# Patient Record
Sex: MALE | Race: BLACK OR AFRICAN AMERICAN | Employment: UNEMPLOYED | URBAN - METROPOLITAN AREA
[De-identification: names, ages, dates, MRNs, and addresses within clinical notes are randomized per-mention and may not be internally consistent; named-entity substitution may affect disease eponyms.]

---

## 2017-03-17 ENCOUNTER — OFFICE VISIT (OUTPATIENT)
Dept: BEHAVIORAL/MENTAL HEALTH CLINIC | Age: 57
End: 2017-03-17

## 2017-08-11 ENCOUNTER — HOSPITAL ENCOUNTER (INPATIENT)
Age: 57
LOS: 3 days | Discharge: HOME | End: 2017-08-15
Attending: INTERNAL MEDICINE | Admitting: PSYCHIATRY & NEUROLOGY
Payer: MEDICAID

## 2017-08-11 DIAGNOSIS — F19.10 POLYSUBSTANCE ABUSE (HCC): ICD-10-CM

## 2017-08-11 DIAGNOSIS — F10.10 ETOH ABUSE: ICD-10-CM

## 2017-08-11 DIAGNOSIS — F32.A DEPRESSIVE DISORDER: Primary | ICD-10-CM

## 2017-08-11 LAB
ALBUMIN SERPL BCP-MCNC: 3.9 G/DL (ref 3.5–5)
ALBUMIN/GLOB SERPL: 0.9 {RATIO} (ref 1.1–2.2)
ALP SERPL-CCNC: 53 U/L (ref 45–117)
ALT SERPL-CCNC: 81 U/L (ref 12–78)
AMPHET UR QL SCN: NEGATIVE
ANION GAP BLD CALC-SCNC: 10 MMOL/L (ref 5–15)
APPEARANCE UR: CLEAR
AST SERPL W P-5'-P-CCNC: 150 U/L (ref 15–37)
BACTERIA URNS QL MICRO: NEGATIVE /HPF
BARBITURATES UR QL SCN: NEGATIVE
BASOPHILS # BLD AUTO: 0.1 K/UL (ref 0–0.1)
BASOPHILS # BLD: 1 % (ref 0–1)
BENZODIAZ UR QL: NEGATIVE
BILIRUB SERPL-MCNC: 0.6 MG/DL (ref 0.2–1)
BILIRUB UR QL: NEGATIVE
BUN SERPL-MCNC: 9 MG/DL (ref 6–20)
BUN/CREAT SERPL: 7 (ref 12–20)
CALCIUM SERPL-MCNC: 8.8 MG/DL (ref 8.5–10.1)
CANNABINOIDS UR QL SCN: POSITIVE
CHLORIDE SERPL-SCNC: 97 MMOL/L (ref 97–108)
CO2 SERPL-SCNC: 27 MMOL/L (ref 21–32)
COCAINE UR QL SCN: POSITIVE
COLOR UR: NORMAL
CREAT SERPL-MCNC: 1.22 MG/DL (ref 0.7–1.3)
DRUG SCRN COMMENT,DRGCM: ABNORMAL
EOSINOPHIL # BLD: 0.2 K/UL (ref 0–0.4)
EOSINOPHIL NFR BLD: 3 % (ref 0–7)
EPITH CASTS URNS QL MICRO: NORMAL /LPF
ERYTHROCYTE [DISTWIDTH] IN BLOOD BY AUTOMATED COUNT: 13.3 % (ref 11.5–14.5)
ETHANOL SERPL-MCNC: 275 MG/DL
GLOBULIN SER CALC-MCNC: 4.5 G/DL (ref 2–4)
GLUCOSE SERPL-MCNC: 89 MG/DL (ref 65–100)
GLUCOSE UR STRIP.AUTO-MCNC: NEGATIVE MG/DL
HCT VFR BLD AUTO: 33.4 % (ref 36.6–50.3)
HGB BLD-MCNC: 11.6 G/DL (ref 12.1–17)
HGB UR QL STRIP: NEGATIVE
KETONES UR QL STRIP.AUTO: NEGATIVE MG/DL
LEUKOCYTE ESTERASE UR QL STRIP.AUTO: NEGATIVE
LYMPHOCYTES # BLD AUTO: 36 % (ref 12–49)
LYMPHOCYTES # BLD: 2.3 K/UL (ref 0.8–3.5)
MCH RBC QN AUTO: 32.4 PG (ref 26–34)
MCHC RBC AUTO-ENTMCNC: 34.7 G/DL (ref 30–36.5)
MCV RBC AUTO: 93.3 FL (ref 80–99)
METHADONE UR QL: NEGATIVE
MONOCYTES # BLD: 0.7 K/UL (ref 0–1)
MONOCYTES NFR BLD AUTO: 11 % (ref 5–13)
NEUTS SEG # BLD: 3.1 K/UL (ref 1.8–8)
NEUTS SEG NFR BLD AUTO: 49 % (ref 32–75)
NITRITE UR QL STRIP.AUTO: NEGATIVE
OPIATES UR QL: NEGATIVE
PCP UR QL: NEGATIVE
PH UR STRIP: 5 [PH] (ref 5–8)
PLATELET # BLD AUTO: 170 K/UL (ref 150–400)
POTASSIUM SERPL-SCNC: 4.2 MMOL/L (ref 3.5–5.1)
PROT SERPL-MCNC: 8.4 G/DL (ref 6.4–8.2)
PROT UR STRIP-MCNC: NEGATIVE MG/DL
RBC # BLD AUTO: 3.58 M/UL (ref 4.1–5.7)
RBC #/AREA URNS HPF: NORMAL /HPF (ref 0–5)
SODIUM SERPL-SCNC: 134 MMOL/L (ref 136–145)
SP GR UR REFRACTOMETRY: <1.005 (ref 1–1.03)
UA: UC IF INDICATED,UAUC: NORMAL
UROBILINOGEN UR QL STRIP.AUTO: 0.2 EU/DL (ref 0.2–1)
WBC # BLD AUTO: 6.3 K/UL (ref 4.1–11.1)
WBC URNS QL MICRO: NORMAL /HPF (ref 0–4)

## 2017-08-11 PROCEDURE — 80307 DRUG TEST PRSMV CHEM ANLYZR: CPT | Performed by: PHYSICIAN ASSISTANT

## 2017-08-11 PROCEDURE — 90791 PSYCH DIAGNOSTIC EVALUATION: CPT

## 2017-08-11 PROCEDURE — 99284 EMERGENCY DEPT VISIT MOD MDM: CPT

## 2017-08-11 PROCEDURE — 85025 COMPLETE CBC W/AUTO DIFF WBC: CPT | Performed by: PHYSICIAN ASSISTANT

## 2017-08-11 PROCEDURE — 81001 URINALYSIS AUTO W/SCOPE: CPT | Performed by: PHYSICIAN ASSISTANT

## 2017-08-11 PROCEDURE — 36415 COLL VENOUS BLD VENIPUNCTURE: CPT | Performed by: PHYSICIAN ASSISTANT

## 2017-08-11 PROCEDURE — 80053 COMPREHEN METABOLIC PANEL: CPT | Performed by: PHYSICIAN ASSISTANT

## 2017-08-11 PROCEDURE — 96365 THER/PROPH/DIAG IV INF INIT: CPT

## 2017-08-11 NOTE — IP AVS SNAPSHOT
[facility] 
 
 
 [address] Patient: Moraima Braun MRN: [id number] UYI:2/67/0157 You are allergic to the following No active allergies Recent Documentation Height Weight BMI Smoking Status 1.88 m 77.1 kg 21.83 kg/m2 Current Some Day Smoker Emergency Contacts Name Discharge Info Relation Home Work Mobile Hernandez,Christine N/A  AT THIS TIME [6] Other Relative [6] [phone] About your hospitalization You were admitted on:  August 12, 2017 You last received care in the:  [facility]. 291 You were discharged on:  August 15, 2017 Unit phone number:  [phone] Why you were hospitalized Your primary diagnosis was:  Alcohol Dependence With Alcohol-Induced Mood Disorder (Hcc) Your diagnoses also included:  Polysubstance Abuse, Htn (Hypertension), Malingering, Alcohol Withdrawal Syndrome Without Complication (Hcc) Providers Seen During Your Hospitalizations Provider Role Specialty Primary office phone Ochoa Salgado MD Attending Provider Emergency Medicine [phone] Jordan Mojica MD Attending Provider Psychiatry [phone] Your Primary Care Physician (PCP) Primary Care Physician Office Phone Office Fax Brandtarie Yanes [phone] Follow-up Information Follow up With Details Comments Contact Info [address] Nik Cho  Walk-in appointment 8/16/17 @ 7:30am for mental health assessment and services and substance abuse services  [address] [place] [id number] 
[phone] Current Discharge Medication List  
  
START taking these medications Dose & Instructions Dispensing Information Comments Morning Noon Evening Bedtime  
 gabapentin 300 mg capsule Commonly known as:  NEURONTIN Your last dose was: Your next dose is: Dose:  300 mg Take 1 Cap by mouth three (3) times daily for 3 days. Indications: alcohol withdrawal sxs Quantity:  9 Cap Refills:  0 CONTINUE these medications which have NOT CHANGED Dose & Instructions Dispensing Information Comments Morning Noon Evening Bedtime  
 lisinopril 5 mg tablet Commonly known as:  Alissa Aisha Your last dose was: Your next dose is:    
   
   
 Dose:  5 mg Take 1 Tab by mouth daily. Indications: hypertension Quantity:  14 Tab Refills:  1 STOP taking these medications ALPRAZolam 0.25 mg tablet Commonly known as:  XANAX  
   
  
 diazePAM 5 mg tablet Commonly known as:  VALIUM  
   
  
 ibuprofen 800 mg tablet Commonly known as:  MOTRIN  
   
  
 methocarbamol 500 mg tablet Commonly known as:  ROBAXIN  
   
  
 thiamine 100 mg tablet Commonly known as:  B-1  
   
  
 traMADol 50 mg tablet Commonly known as:  ULTRAM  
   
  
  
  
Where to Get Your Medications Information on where to get these meds will be given to you by the nurse or doctor. ! Ask your nurse or doctor about these medications  
  gabapentin 300 mg capsule  
 lisinopril 5 mg tablet Discharge Instructions DISCHARGE SUMMARY from Nurse The following personal items are in your possession at time of discharge: 
 
Dental Appliances: None Visual Aid: None Home Medications: None Jewelry: None Clothing: Belt, Footwear, [place], Jacket/Coat, [place], Shirt Other Valuables:  (sent ot safe) PATIENT INSTRUCTIONS: 
 
 
 
 
What to do at Home: 
Recommended activity: Activity as tolerated, If I feel that I can not keep these promises and I am at risk of hurting myself or others, I will call the crisis office and speak with a crisis worker who will assist me during my crisis. [address] [place] [address]  [phone] [place] [address]   [phone] [facility]  [phone] [facility]  [phone] *  Please give a list of your current medications to your Primary Care Provider. *  Please update this list whenever your medications are discontinued, doses are 
    changed, or new medications (including over-the-counter products) are added. *  Please carry medication information at all times in case of emergency situations. These are general instructions for a healthy lifestyle: No smoking/ No tobacco products/ Avoid exposure to second hand smoke Surgeon General's Warning:  Quitting smoking now greatly reduces serious risk to your health. Obesity, smoking, and sedentary lifestyle greatly increases your risk for illness A healthy diet, regular physical exercise & weight monitoring are important for maintaining a healthy lifestyle Recognize signs and symptoms of STROKE: 
 
F-face looks uneven A-arms unable to move or move unevenly S-speech slurred or non-existent T-time-call 911 as soon as signs and symptoms begin-DO NOT go Back to bed or wait to see if you get better-TIME IS BRAIN. Warning Signs of HEART ATTACK Call 911 if you have these symptoms: 
? Chest discomfort. Most heart attacks involve discomfort in the center of the chest that lasts more than a few minutes, or that goes away and comes back. It can feel like uncomfortable pressure, squeezing, fullness, or pain. ? Discomfort in other areas of the upper body. Symptoms can include pain or discomfort in one or both arms, the back, neck, jaw, or stomach. ? Shortness of breath with or without chest discomfort. ? Other signs may include breaking out in a cold sweat, nausea, or lightheadedness. Don't wait more than five minutes to call [address]! Fast action can save your life.  Calling 911 is almost always the fastest way to get lifesaving treatment. Emergency Medical Services staff can begin treatment when they arrive  up to an hour sooner than if someone gets to the hospital by car. The discharge information has been reviewed with the patient. The patient verbalized understanding. Discharge medications reviewed with the patient and appropriate educational materials and side effects teaching were provided. Discharge Orders None SmartMovehart Announcement We are excited to announce that we are making your provider's discharge notes available to you in Wireless Generation. You will see these notes when they are completed and signed by the physician that discharged you from your recent hospital stay. If you have any questions or concerns about any information you see in SmartMovehart, please call the Health Information Department where you were seen or reach out to your Primary Care Provider for more information about your plan of care. Introducing [facility]! [facility] introduces Wireless Generation patient portal. Now you can access parts of your medical record, email your doctor's office, and request medication refills online. 1. In your internet browser, go to [email or web address] 2. Click on the First Time User? Click Here link in the Sign In box. You will see the New Member Sign Up page. 3. Enter your Wireless Generation Access Code exactly as it appears below. You will not need to use this code after youve completed the sign-up process. If you do not sign up before the expiration date, you must request a new code. · Wireless Generation Access Code: [email or web address] Expires: 11/13/2017 10:36 AM 
 
4. Enter the last four digits of your Social Security Number (xxxx) and Date of Birth (mm/dd/yyyy) as indicated and click Submit. You will be taken to the next sign-up page. 5. Create a Wireless Generation ID. This will be your Wireless Generation login ID and cannot be changed, so think of one that is secure and easy to remember. 6. Create a PublicBeta password. You can change your password at any time. 7. Enter your Password Reset Question and Answer. This can be used at a later time if you forget your password. 8. Enter your e-mail address. You will receive e-mail notification when new information is available in [address] 9. Click Sign Up. You can now view and download portions of your medical record. 10. Click the Download Summary menu link to download a portable copy of your medical information. If you have questions, please visit the Frequently Asked Questions section of the PublicBeta website. Remember, PublicBeta is NOT to be used for urgent needs. For medical emergencies, dial 911. Now available from your iPhone and Android! General Information Please provide this summary of care documentation to your next provider. Patient Signature:  ____________________________________________________________ Date:  ____________________________________________________________  
  
Maddie Yoo Provider Signature:  ____________________________________________________________ Date:  ____________________________________________________________

## 2017-08-11 NOTE — IP AVS SNAPSHOT
[facility] 
 
 
 [address] Patient: Sarah Banks MRN: [id number] VLE:9/[id number] Current Discharge Medication List  
  
START taking these medications Dose & Instructions Dispensing Information Comments Morning Noon Evening Bedtime  
 gabapentin 300 mg capsule Commonly known as:  NEURONTIN Your last dose was: Your next dose is:    
   
   
 Dose:  300 mg Take 1 Cap by mouth three (3) times daily for 3 days. Indications: alcohol withdrawal sxs Quantity:  9 Cap Refills:  0 CONTINUE these medications which have NOT CHANGED Dose & Instructions Dispensing Information Comments Morning Noon Evening Bedtime  
 lisinopril 5 mg tablet Commonly known as:  Deirdre Flight Your last dose was: Your next dose is:    
   
   
 Dose:  5 mg Take 1 Tab by mouth daily. Indications: hypertension Quantity:  14 Tab Refills:  1 STOP taking these medications ALPRAZolam 0.25 mg tablet Commonly known as:  XANAX  
   
  
 diazePAM 5 mg tablet Commonly known as:  VALIUM  
   
  
 ibuprofen 800 mg tablet Commonly known as:  MOTRIN  
   
  
 methocarbamol 500 mg tablet Commonly known as:  ROBAXIN  
   
  
 thiamine 100 mg tablet Commonly known as:  B-1  
   
  
 traMADol 50 mg tablet Commonly known as:  ULTRAM  
   
  
  
  
Where to Get Your Medications Information on where to get these meds will be given to you by the nurse or doctor. ! Ask your nurse or doctor about these medications  
  gabapentin 300 mg capsule  
 lisinopril 5 mg tablet

## 2017-08-12 PROBLEM — F32.A DEPRESSIVE DISORDER: Status: ACTIVE | Noted: 2017-08-12

## 2017-08-12 PROBLEM — F19.10 POLYSUBSTANCE ABUSE (HCC): Status: ACTIVE | Noted: 2017-08-12

## 2017-08-12 LAB — ETHANOL SERPL-MCNC: 114 MG/DL

## 2017-08-12 PROCEDURE — 65220000003 HC RM SEMIPRIVATE PSYCH

## 2017-08-12 PROCEDURE — 80307 DRUG TEST PRSMV CHEM ANLYZR: CPT | Performed by: INTERNAL MEDICINE

## 2017-08-12 PROCEDURE — 36415 COLL VENOUS BLD VENIPUNCTURE: CPT | Performed by: INTERNAL MEDICINE

## 2017-08-12 PROCEDURE — 74011250636 HC RX REV CODE- 250/636: Performed by: INTERNAL MEDICINE

## 2017-08-12 PROCEDURE — 74011000250 HC RX REV CODE- 250: Performed by: INTERNAL MEDICINE

## 2017-08-12 PROCEDURE — 74011250637 HC RX REV CODE- 250/637: Performed by: PSYCHIATRY & NEUROLOGY

## 2017-08-12 RX ORDER — FOLIC ACID 5 MG/ML
INJECTION, SOLUTION INTRAMUSCULAR; INTRAVENOUS; SUBCUTANEOUS
Status: DISPENSED
Start: 2017-08-12 | End: 2017-08-12

## 2017-08-12 RX ORDER — ZOLPIDEM TARTRATE 10 MG/1
10 TABLET ORAL
Status: DISCONTINUED | OUTPATIENT
Start: 2017-08-12 | End: 2017-08-15 | Stop reason: HOSPADM

## 2017-08-12 RX ORDER — BENZTROPINE MESYLATE 2 MG/1
2 TABLET ORAL
Status: DISCONTINUED | OUTPATIENT
Start: 2017-08-12 | End: 2017-08-15 | Stop reason: HOSPADM

## 2017-08-12 RX ORDER — LORAZEPAM 1 MG/1
1 TABLET ORAL
Status: DISCONTINUED | OUTPATIENT
Start: 2017-08-12 | End: 2017-08-12

## 2017-08-12 RX ORDER — LISINOPRIL 5 MG/1
5 TABLET ORAL DAILY
Status: DISCONTINUED | OUTPATIENT
Start: 2017-08-13 | End: 2017-08-15 | Stop reason: HOSPADM

## 2017-08-12 RX ORDER — IBUPROFEN 200 MG
1 TABLET ORAL
Status: DISCONTINUED | OUTPATIENT
Start: 2017-08-12 | End: 2017-08-15 | Stop reason: HOSPADM

## 2017-08-12 RX ORDER — IBUPROFEN 400 MG/1
400 TABLET ORAL
Status: DISCONTINUED | OUTPATIENT
Start: 2017-08-12 | End: 2017-08-15 | Stop reason: HOSPADM

## 2017-08-12 RX ORDER — LANOLIN ALCOHOL/MO/W.PET/CERES
100 CREAM (GRAM) TOPICAL DAILY
Status: DISCONTINUED | OUTPATIENT
Start: 2017-08-12 | End: 2017-08-15

## 2017-08-12 RX ORDER — ADHESIVE BANDAGE
30 BANDAGE TOPICAL DAILY PRN
Status: DISCONTINUED | OUTPATIENT
Start: 2017-08-12 | End: 2017-08-15 | Stop reason: HOSPADM

## 2017-08-12 RX ORDER — ACETAMINOPHEN 325 MG/1
650 TABLET ORAL
Status: DISCONTINUED | OUTPATIENT
Start: 2017-08-12 | End: 2017-08-15 | Stop reason: HOSPADM

## 2017-08-12 RX ORDER — LORAZEPAM 2 MG/ML
2 INJECTION INTRAMUSCULAR
Status: DISCONTINUED | OUTPATIENT
Start: 2017-08-12 | End: 2017-08-12

## 2017-08-12 RX ORDER — LORAZEPAM 1 MG/1
4 TABLET ORAL
Status: DISCONTINUED | OUTPATIENT
Start: 2017-08-12 | End: 2017-08-15 | Stop reason: HOSPADM

## 2017-08-12 RX ORDER — LORAZEPAM 1 MG/1
2 TABLET ORAL
Status: DISCONTINUED | OUTPATIENT
Start: 2017-08-12 | End: 2017-08-15 | Stop reason: HOSPADM

## 2017-08-12 RX ORDER — PROCHLORPERAZINE EDISYLATE 5 MG/ML
10 INJECTION INTRAMUSCULAR; INTRAVENOUS
Status: DISCONTINUED | OUTPATIENT
Start: 2017-08-12 | End: 2017-08-15 | Stop reason: HOSPADM

## 2017-08-12 RX ORDER — FOLIC ACID 1 MG/1
1 TABLET ORAL DAILY
Status: DISCONTINUED | OUTPATIENT
Start: 2017-08-12 | End: 2017-08-15

## 2017-08-12 RX ORDER — THIAMINE HYDROCHLORIDE 100 MG/ML
INJECTION, SOLUTION INTRAMUSCULAR; INTRAVENOUS
Status: DISPENSED
Start: 2017-08-12 | End: 2017-08-12

## 2017-08-12 RX ORDER — OLANZAPINE 5 MG/1
5 TABLET ORAL
Status: DISCONTINUED | OUTPATIENT
Start: 2017-08-12 | End: 2017-08-15 | Stop reason: HOSPADM

## 2017-08-12 RX ORDER — PROCHLORPERAZINE MALEATE 10 MG
10 TABLET ORAL
Status: DISCONTINUED | OUTPATIENT
Start: 2017-08-12 | End: 2017-08-15 | Stop reason: HOSPADM

## 2017-08-12 RX ORDER — BENZTROPINE MESYLATE 1 MG/ML
2 INJECTION INTRAMUSCULAR; INTRAVENOUS
Status: DISCONTINUED | OUTPATIENT
Start: 2017-08-12 | End: 2017-08-15 | Stop reason: HOSPADM

## 2017-08-12 RX ORDER — LORAZEPAM 1 MG/1
2 TABLET ORAL
Status: COMPLETED | OUTPATIENT
Start: 2017-08-12 | End: 2017-08-12

## 2017-08-12 RX ADMIN — FOLIC ACID 1 MG: 1 TABLET ORAL at 09:26

## 2017-08-12 RX ADMIN — MULTIPLE VITAMINS W/ MINERALS TAB 1 TABLET: TAB at 09:26

## 2017-08-12 RX ADMIN — THIAMINE HYDROCHLORIDE: 100 INJECTION, SOLUTION INTRAMUSCULAR; INTRAVENOUS at 01:53

## 2017-08-12 RX ADMIN — Medication 100 MG: at 09:26

## 2017-08-12 RX ADMIN — LORAZEPAM 2 MG: 1 TABLET ORAL at 09:26

## 2017-08-12 RX ADMIN — LORAZEPAM 2 MG: 1 TABLET ORAL at 11:25

## 2017-08-12 RX ADMIN — LORAZEPAM 2 MG: 1 TABLET ORAL at 09:57

## 2017-08-12 NOTE — BSMART NOTE
Comprehensive Assessment Form Part 1      Section I - Disposition    Axis I - F32.9 Unspecified depressive disorder                 Cocaine Use disorder                   Alcohol dependency   Axis II - No diagnosis   Axis III -    Hypertension     Axis IV - Problems with personal support system, lack of access to treatment, homelessness, substance use  Udell V - 65      The Medical Doctor to Psychiatrist conference was not completed. The Medical Doctor is in agreement with Psychiatrist disposition because of (reason) voluntary hospitalization stay is least restrictive intervention at this time. The plan is  Admit to [facility] unit 323-1. The on-call Psychiatrist consulted was Dr. Timothy Zheng. The admitting Psychiatrist will be Dr. Timothy Zheng. The admitting Diagnosis is Unspecified Depressive Disorder   The Payor source is medicaid/medicare. The name of the representative was N/A. No approval needed. Section II - Integrated Summary  Summary:Patient presented to the ED today reporting that he Is going through a \"mental depression breakdown\" and asked for immediate intervention to avoid exacerbation of his current symptoms. Primary complaint today is that Patient is homeless and has felt that he has hit rock bottom. Patient indicated that he has been having suicidal thoughts for the past few days due to him not having adequate housing, money, or a support system. He indicated that he is afraid of what I might do and indicated that he did not feel that he could keep himself safe outside of the hospital. Patient does not have a history of psychiatric evaluations and does not have strong history of mental health treatment. He reports last seeing a psychiatrist in 2016 and has not been managed with medication by a psychiatrist since then. Patient self-reported that he was being treated for depression and anxiety with Dr. Tika Gannon prior to the practice closing.  Patient reports that he has been diagnosed with a depressive disorder for about 8-9 years and has always managed his treatment outpatient. He denied ever being service connected for case management; has limited knowledge of his medical coverage or services available to him. Patient reports that he is current prescribed Xanax, lamictal, and a blood pressure medication; being prescribed by PCP. He states that he has not taken his medication for 1.5 weeks. He reports that he has had passive thoughts of suicide in the past but identified that SI were more intense tonight. He states that he is unable to contract for safety at this time and does want support. Patient indicated that that he self-medicates with crack and marijuana typically and has used drugs and alcohol in the past 48 hours. Today, he has a MITA of 257 upon intake and is positive for both crack and marijuana. Patient is requesting inpatient hospitalization to assist him with stabilizing on medication, learning coping skills, and service coordination prior to discharge. Dr. Codi Simon was consulted after [facility] assessment and determined that Patient was too intoxicated to consent to treatment; recommendation was that Patient sober up and be reassessed within 4 hours. Patient will be reassessed at 4:30 am once MITA is repeated.     5:02am  Repeat MITA was completed and resulted. Patient is at 114 MITA. Though it is still above the requested 100 Patient is oriented and able to consent for treatment. He is oriented in all spheres, endorsing SI and denying psychosis. Reassessment was completed and Patient maintains that he is unable to contact for safety at this time. He is requesting inpatient treatment to assist him with coping skills, managing depression with medication, and service coordination at discharge. Patient is pre-contemplative with SA but accepted supportive counseling around sobriety; we discussed mixing illegal drugs with psychotropic medication. Patient was accepted at [facility] unit room 323 bed 1.  Patient has Medicaid/Medicare insurance as payor source. The patienthas demonstrated mental capacity to provide informed consent. The information is given by the patient. The Chief Complaint is depression   The Precipitant Factors are homelessness; lack of finances, lack of support system  Previous Hospitalizations: Not Reported or noted   The patient has not previously been in restraints. Current Psychiatrist and/or  is Patient does not have a current psychiatrist.     Lethality Assessment:    The potential for suicide noted by the following: ideation . The potential for homicide is not noted. The patient has not been a perpetrator of sexual or physical abuse. There are not pending charges. The patient is not felt to be at risk for self harm or harm to others. The attending nurse was advised that Patient is not acute at this time. Section III - Psychosocial  The patient's overall mood and attitude is depressed. Feelings of helplessness and hopelessness are observed by verbalizations. Generalized anxiety is not observed although it was communicated by Patient. Panic is not observed. Phobias are not observed. Obsessive compulsive tendencies are not observed. Section IV - Mental Status Exam  The patient's appearance shows poor hygiene. The patient's behavior show psychomotor dysfunction. The patient is oriented to time, place, person and situation. The patient's speech is slowed. The patient's mood is depressed. The range of affect is flat. The patient's thought content demonstrates no evidence of impairment. The thought process shows no evidence of impairment. The patient's perception shows no evidence of impairment. The patient's memory shows no evidence of impairment. The patient's appetite is decreased but it is not determined whether the Patient shows signs of weight loss. The patient's sleep has evidence of insomnia. The patient's insight is blaming.   The patient's judgement is psychologically impaired. Section V - Substance Abuse  The patient is using substances. The patient is using tobacco by inhalation, Crack by inhalation, marijuana by inhalation for greater than 2 years with last use on or around 8/10. The patient has experienced the following withdrawal symptoms: N/A. Section VI - Living Arrangements  The patient is single. The patient was living with a female friend but reports that he was told to leave about 1 week ago. The patient has one child age [id number] [place]. The patient does not plan to return home upon discharge. The patient does have legal issues pending. The patient's source of income comes from social security. Orthodoxy and cultural practices have been noted and include: Baptism. The patient's greatest support comes from none reported and this person will not be involved with the treatment. The patient has not been in an event described as horrible or outside the realm of ordinary life experience either currently or in the past.  The patient has not been a victim of sexual/physical abuse. Section VII - Other Areas of Clinical Concern  The highest grade achieved is 12th grade with the overall quality of school experience being described as not reported. The patient is currently disabled and speaks [place] as a primary language. The patient has no communication impairments affecting communication. The patient's preference for learning can be described as: can read and write adequately.   The patient's hearing is normal.  The patient's vision is normal.      Sharon Hernandez MA, Resident in Counseling   [facility] Counselor

## 2017-08-12 NOTE — BH NOTES
Patient is a 75years old male admitted to the unit voluntarily with depression, with suicidal ideation with no plan. Patient reports he is depressed for a week. BAL - 275, repeated at 0430 hrs - 114. UDS positive for cocaine and THC. Medical conditions includes Hypertension, No known allergies. On admission patient is cooperative with admission process. Skin assessment done by William RICHARDSON and Emelyn SCHUMACHER which is unremarkable except dry skin of the feet. No tattoos or open cuts or wounds noted. Ambulating with steady gait. Q 15 minutes monitoring maintained for the safety.

## 2017-08-12 NOTE — BH NOTES
Problem: Depressed Mood (Adult/Pediatric)  Goal: *STG: Remains safe in hospital  Outcome: Progressing Towards Goal  Pt is mostly quiet in the milieu. Pt's affect is brighter, mood less anxious, more relaxed. Pt is meds/meals compliant. Pt has good appetite. Pt is going through a safe alcohol detox presently.  Will continue to monitor q 15 for safety, mood and behavior changes.
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
## 2017-08-12 NOTE — ED NOTES
Report called to [facility]. SBAR given Pt to be transported to [facility] via wheelchair by [email or web address].

## 2017-08-12 NOTE — BH NOTES
Problem: Depressed Mood (Adult/Pediatric)  Goal: *STG: Remains safe in hospital  Outcome: Progressing Towards Goal  Pt is mostly quiet in the milieu. Pt's affect is brighter, mood less anxious. Pt is meds/meals compliant. Pt is going through a safe alcohol detox presently. Will continue to monitor q 15 for safety, mood and behavior changes.

## 2017-08-12 NOTE — ED PROVIDER NOTES
Patient is a 61 y.o. male presenting with mental health disorder. The history is provided by the patient. Mental Health Problem    This is a new problem. Episode onset: pt reports depression and SI without a plan x 1wk. pt is homeless. The problem has not changed since onset. Pertinent negatives include no weakness. Mental status baseline is normal.  Risk factors include the patient not taking meds correctly and alcohol intake. His past medical history is significant for hypertension. His past medical history does not include psychotropic medication treatment. Past medical history comments: anxiety. Past Medical History:   Diagnosis Date    Hypertension     Psychiatric disorder     anxiety       History reviewed. No pertinent surgical history. History reviewed. No pertinent family history. Social History     Social History    Marital status: UNKNOWN     Spouse name: N/A    Number of children: N/A    Years of education: N/A     Occupational History    Not on file. Social History Main Topics    Smoking status: Current Some Day Smoker     Packs/day: 0.25    Smokeless tobacco: Never Used    Alcohol use 6.0 oz/week     10 Cans of beer per week      Comment: daily 6/27/16    Drug use: Yes     Special: Marijuana    Sexual activity: Not on file     Other Topics Concern    Not on file     Social History Narrative         ALLERGIES: Review of patient's allergies indicates no known allergies. Review of Systems   Respiratory: Negative for shortness of breath and wheezing. Gastrointestinal: Negative. Neurological: Positive for headaches. Negative for speech difficulty and weakness. Psychiatric/Behavioral: Positive for suicidal ideas. All other systems reviewed and are negative.       Vitals:    08/11/17 2132   BP: (!) 146/114   Pulse: (!) 104   Resp: 18   Temp: 98 °F (36.7 °C)   SpO2: 99%   Weight: 77.1 kg (170 lb)   Height: 6' 2\" (1.88 m)            Physical Exam   Constitutional: He is oriented to person, place, and time. He appears well-developed and well-nourished. No distress. HENT:   Head: Normocephalic and atraumatic. Mouth/Throat: Oropharynx is clear and moist. No oropharyngeal exudate. Eyes: Conjunctivae are normal.   Cardiovascular: Normal rate, regular rhythm and normal heart sounds. Pulmonary/Chest: Effort normal and breath sounds normal. No respiratory distress. He has no wheezes. He has no rales. Abdominal: Soft. Bowel sounds are normal.   Musculoskeletal: Normal range of motion. Neurological: He is alert and oriented to person, place, and time. Skin: Skin is warm and dry. Psychiatric: He has a normal mood and affect. His behavior is normal. Judgment and thought content normal.   Nursing note and vitals reviewed.        MDM  Number of Diagnoses or Management Options  Diagnosis management comments: DDX: depression, SI, malingering, ETOH intoxication    Progress Note:  12:00 AM  [facility] just arrived for evaluation, care turned over to attending, Dr Hanh Garrett, at this time for dispo and tx plan       Amount and/or Complexity of Data Reviewed  Clinical lab tests: ordered and reviewed  Discuss the patient with other providers: yes      ED Course       Procedures

## 2017-08-12 NOTE — H&P
History and Physical    Subjective:     Sabine Vanegas is a 61 y.o. male with past medical hx significant for HTN. Pt is admitted to the hospital for Depression. Pt is abusing Etoh, Cocaine, and Marjuanna. Pt is on Travisfort protocol for withrawal. He is taking lisinopril for HTN. Past Medical History:   Diagnosis Date    Hypertension     Psychiatric disorder     anxiety      PSHx: none    Family History   Problem Relation Age of Onset    Alcohol abuse Father       Social History   Substance Use Topics    Smoking status: Current Some Day Smoker     Packs/day: 0.25    Smokeless tobacco: Never Used    Alcohol use 2.4 - 6.0 oz/week     4 - 10 Cans of beer per week      Comment: daily, hx of withdrawal. ?no seizure/ DTs       Prior to Admission medications    Medication Sig Start Date End Date Taking? Authorizing Provider   traMADol (ULTRAM) 50 mg tablet Take 1 Tab by mouth every eight (8) hours as needed for Pain for up to 10 doses. Max Daily Amount: 150 mg. 9/16/16   Savage Tse MD   ibuprofen (MOTRIN) 800 mg tablet Take 1 Tab by mouth every eight (8) hours as needed for Pain for up to 8 doses. 9/5/16   Savage Tse MD   diazepam (VALIUM) 5 mg tablet Take 1 Tab by mouth three (3) times daily as needed for Anxiety (spasm). Max Daily Amount: 15 mg. 8/30/16   Candice Medina MD   methocarbamol (ROBAXIN) 500 mg tablet Take 1 Tab by mouth every six (6) hours as needed (mm spasm). 12/2/15   Esau Barker PA-C   thiamine (B-1) 100 mg tablet Take 1 Tab by mouth daily. 9/30/13   Irine Goldmann, MD   ALPRAZolam Marlen Pablo) 0.25 mg tablet Take 0.25 mg by mouth nightly as needed. Felipe Olivarez MD   lisinopril (PRINIVIL, ZESTRIL) 5 mg tablet Take 5 mg by mouth daily.     Felipe Olivarez MD     No Known Allergies     Review of Systems:  Constitutional: negative  Eyes: negative  Ears, Nose, Mouth, Throat, and Face: negative  Respiratory: negative  Cardiovascular: negative  Gastrointestinal: negative  Genitourinary:negative  Integument/Breast: negative  Hematologic/Lymphatic: negative  Musculoskeletal:negative  Neurological: negative  Behavioral/Psychiatric: Depression. Endocrine: negative  Allergic/Immunologic: negative     Objective: Intake and Output:            Physical Exam:   Visit Vitals    /79    Pulse (!) 123    Temp 98 °F (36.7 °C)    Resp 18    Ht 6' 2\" (1.88 m)    Wt 77.1 kg (170 lb)    SpO2 96%    BMI 21.83 kg/m2     General:  Alert, cooperative, no distress, appears stated age. Head:  Normocephalic, without obvious abnormality, atraumatic. Eyes:  Conjunctivae/corneas clear. PERRL, EOMs intact. Ears:  Normal external ear canals both ears. Nose: Nares normal. Septum midline. Mucosa normal. No drainage or sinus tenderness. Throat: Lips, mucosa, and tongue normal. Teeth and gums normal.   Neck: Supple, symmetrical, trachea midline, no adenopathy, thyroid: no enlargement/tenderness/nodules, no carotid bruit and no JVD. Back:   Symmetric, no curvature. ROM normal. No CVA tenderness. Lungs:   Clear to auscultation bilaterally. Chest wall:  No tenderness or deformity. Heart:  Regular rate and rhythm, S1, S2 normal, no murmur, click, rub or gallop. Abdomen:   Soft, non-tender. Bowel sounds normal. No masses,  No organomegaly. Extremities: Extremities normal, atraumatic, no cyanosis or edema. Pulses: 2+ and symmetric all extremities. Skin: Skin color, texture, turgor normal. No rashes or lesions   Lymph nodes: Cervical, supraclavicular, and axillary nodes normal.   Neurologic: CNII-XII intact. Normal strength, sensation and reflexes throughout.      Data Review:   Recent Results (from the past 24 hour(s))   CBC WITH AUTOMATED DIFF    Collection Time: 08/11/17 10:09 PM   Result Value Ref Range    WBC 6.3 4.1 - 11.1 K/uL    RBC 3.58 (L) 4.10 - 5.70 M/uL    HGB 11.6 (L) 12.1 - 17.0 g/dL    HCT 33.4 (L) 36.6 - 50.3 %    MCV 93.3 80.0 - 99.0 FL    MCH 32.4 26.0 - 34.0 PG    MCHC 34.7 30.0 - 36.5 g/dL    RDW 13.3 11.5 - 14.5 %    PLATELET 981 898 - 613 K/uL    NEUTROPHILS 49 32 - 75 %    LYMPHOCYTES 36 12 - 49 %    MONOCYTES 11 5 - 13 %    EOSINOPHILS 3 0 - 7 %    BASOPHILS 1 0 - 1 %    ABS. NEUTROPHILS 3.1 1.8 - 8.0 K/UL    ABS. LYMPHOCYTES 2.3 0.8 - 3.5 K/UL    ABS. MONOCYTES 0.7 0.0 - 1.0 K/UL    ABS. EOSINOPHILS 0.2 0.0 - 0.4 K/UL    ABS. BASOPHILS 0.1 0.0 - 0.1 K/UL   DRUG SCREEN, URINE    Collection Time: 08/11/17 10:09 PM   Result Value Ref Range    AMPHETAMINES NEGATIVE  NEG      BARBITURATES NEGATIVE  NEG      BENZODIAZEPINE NEGATIVE  NEG      COCAINE POSITIVE (A) NEG      METHADONE NEGATIVE  NEG      OPIATES NEGATIVE  NEG      PCP(PHENCYCLIDINE) NEGATIVE  NEG      THC (TH-CANNABINOL) POSITIVE (A) NEG      Drug screen comment (NOTE)    METABOLIC PANEL, COMPREHENSIVE    Collection Time: 08/11/17 10:09 PM   Result Value Ref Range    Sodium 134 (L) 136 - 145 mmol/L    Potassium 4.2 3.5 - 5.1 mmol/L    Chloride 97 97 - 108 mmol/L    CO2 27 21 - 32 mmol/L    Anion gap 10 5 - 15 mmol/L    Glucose 89 65 - 100 mg/dL    BUN 9 6 - 20 MG/DL    Creatinine 1.22 0.70 - 1.30 MG/DL    BUN/Creatinine ratio 7 (L) 12 - 20      GFR est AA >60 >60 ml/min/1.73m2    GFR est non-AA >60 >60 ml/min/1.73m2    Calcium 8.8 8.5 - 10.1 MG/DL    Bilirubin, total 0.6 0.2 - 1.0 MG/DL    ALT (SGPT) 81 (H) 12 - 78 U/L    AST (SGOT) 150 (H) 15 - 37 U/L    Alk.  phosphatase 53 45 - 117 U/L    Protein, total 8.4 (H) 6.4 - 8.2 g/dL    Albumin 3.9 3.5 - 5.0 g/dL    Globulin 4.5 (H) 2.0 - 4.0 g/dL    A-G Ratio 0.9 (L) 1.1 - 2.2     URINALYSIS W/ REFLEX CULTURE    Collection Time: 08/11/17 10:09 PM   Result Value Ref Range    Color YELLOW/STRAW      Appearance CLEAR CLEAR      Specific gravity <1.005 1.003 - 1.030    pH (UA) 5.0 5.0 - 8.0      Protein NEGATIVE  NEG mg/dL    Glucose NEGATIVE  NEG mg/dL    Ketone NEGATIVE  NEG mg/dL    Bilirubin NEGATIVE  NEG      Blood NEGATIVE  NEG      Urobilinogen 0.2 0.2 - 1.0 EU/dL    Nitrites NEGATIVE  NEG      Leukocyte Esterase NEGATIVE  NEG      WBC 0-4 0 - 4 /hpf    RBC 0-5 0 - 5 /hpf    Epithelial cells FEW FEW /lpf    Bacteria NEGATIVE  NEG /hpf    UA:UC IF INDICATED CULTURE NOT INDICATED BY UA RESULT CNI     ETHYL ALCOHOL    Collection Time: 08/11/17 10:10 PM   Result Value Ref Range    ALCOHOL(ETHYL),SERUM 275 (H) <10 MG/DL   ETHYL ALCOHOL    Collection Time: 08/12/17  4:36 AM   Result Value Ref Range    ALCOHOL(ETHYL),SERUM 114 (H) <10 MG/DL         Assessment:     Principal Problem:    Alcohol dependence with alcohol-induced mood disorder ([facility] [address] (9/29/2013)    Active Problems:    Polysubstance abuse (8/12/2017)    HTN    Plan:     Lisinoprl 5mg po every day    Clonidine prn    SIWA protocol    MVI/Folic acid/thiamine    No VTE prophylaxis indicated or necessary at this time.    Signed By: Victor Manuel Quiros MD     August 12, 2017

## 2017-08-13 LAB
CHOLEST SERPL-MCNC: 296 MG/DL
HDLC SERPL-MCNC: 217 MG/DL
HDLC SERPL: 1.4 {RATIO} (ref 0–5)
LDLC SERPL CALC-MCNC: 68.8 MG/DL (ref 0–100)
LIPID PROFILE,FLP: ABNORMAL
TRIGL SERPL-MCNC: 51 MG/DL (ref ?–150)
TSH SERPL DL<=0.05 MIU/L-ACNC: 2.07 UIU/ML (ref 0.36–3.74)
VLDLC SERPL CALC-MCNC: 10.2 MG/DL

## 2017-08-13 PROCEDURE — 80061 LIPID PANEL: CPT | Performed by: PSYCHIATRY & NEUROLOGY

## 2017-08-13 PROCEDURE — 65220000003 HC RM SEMIPRIVATE PSYCH

## 2017-08-13 PROCEDURE — 36415 COLL VENOUS BLD VENIPUNCTURE: CPT | Performed by: PSYCHIATRY & NEUROLOGY

## 2017-08-13 PROCEDURE — 74011250637 HC RX REV CODE- 250/637: Performed by: INTERNAL MEDICINE

## 2017-08-13 PROCEDURE — 74011250637 HC RX REV CODE- 250/637: Performed by: PSYCHIATRY & NEUROLOGY

## 2017-08-13 PROCEDURE — 84443 ASSAY THYROID STIM HORMONE: CPT | Performed by: PSYCHIATRY & NEUROLOGY

## 2017-08-13 RX ORDER — GABAPENTIN 300 MG/1
300 CAPSULE ORAL
Status: DISCONTINUED | OUTPATIENT
Start: 2017-08-13 | End: 2017-08-15 | Stop reason: HOSPADM

## 2017-08-13 RX ORDER — GABAPENTIN 100 MG/1
100 CAPSULE ORAL 3 TIMES DAILY
Status: DISCONTINUED | OUTPATIENT
Start: 2017-08-13 | End: 2017-08-13

## 2017-08-13 RX ADMIN — FOLIC ACID 1 MG: 1 TABLET ORAL at 08:28

## 2017-08-13 RX ADMIN — IBUPROFEN 400 MG: 400 TABLET, FILM COATED ORAL at 20:11

## 2017-08-13 RX ADMIN — GABAPENTIN 300 MG: 300 CAPSULE ORAL at 21:44

## 2017-08-13 RX ADMIN — MULTIPLE VITAMINS W/ MINERALS TAB 1 TABLET: TAB at 08:28

## 2017-08-13 RX ADMIN — LORAZEPAM 2 MG: 1 TABLET ORAL at 05:19

## 2017-08-13 RX ADMIN — LORAZEPAM 2 MG: 1 TABLET ORAL at 07:10

## 2017-08-13 RX ADMIN — LISINOPRIL 5 MG: 5 TABLET ORAL at 08:28

## 2017-08-13 RX ADMIN — Medication 100 MG: at 08:28

## 2017-08-13 NOTE — BH NOTES
Patient was received sleeping and he was observed to be sleeping throughout for 7 hours. In the am he reported that he did not sleep. He was experiencing withdrawal symptoms, CIWA-11. He received ativan 2mg prn. ENcouraged him to inform the nurses of he was unable to sleep inspite of the medications.

## 2017-08-13 NOTE — BH NOTES
His Bp was high and he received ativan 2mg prn. He was unsteady on his feed. He was instructed to remain in bed and call for help if needed. Urinal provided.

## 2017-08-13 NOTE — PROGRESS NOTES
Problem: Suicide/Homicide (Adult/Pediatric)  Goal: *STG: Remains safe in hospital  Outcome: Progressing Towards Goal  Patient is calm and cooperative. Visible on the unit. Medication and meal complaint. Patient is being evaluated and   treated for withdrawal symptoms and is placed on the protocol. Please see CIWA. Patient is mostly isolative to self. Will continue to monitor patient and assess needs.

## 2017-08-13 NOTE — BH NOTES
PSYCHIATRIC PROGRESS NOTE         Patient Name  Huong Armas   Date of Birth 1960   [facility] [id number]   Medical Record Number  [id number]      Age  61 y.o. PCP Sam Driscoll MD   Admit date:  8/11/2017    Room Number  323/01  @ [facility]   Date of Service  8/13/2017          PSYCHOTHERAPY SESSION NOTE:  Length of psychotherapy session: 15 minutes    Main condition/diagnosis/issues treated during session today, 8/13/2017 : substance abuse, medication, w/d    I employed Cognitive Behavioral therapy techniques, Reality-Oriented psychotherapy, as well as supportive psychotherapy in regards to various ongoing psychosocial stressors, including the following: pre-admission and current problems; housing issues; occupational issues, medical issues; and stress of hospitalization. Interpersonal relationship issues and psychodynamic conflicts explored. Attempts made to alleviate maladaptive patterns. We, also, worked on issues of denial & effects of substance dependency/use     Overall, patient is progressing    Treatment Plan Update (reviewed an updated 8/13/2017) : I will modify psychotherapy tx plan by implementing more stress management strategies, building upon cognitive behavioral techniques, increasing coping skills, as well as shoring up psychological defenses). An extended energy and skill set was needed to engage pt in psychotherapy due to some of the following: resistiveness, complexity, negativity, confrontational nature, hostile behaviors, and/or severe abnormalities in thought processes/psychosis resulting in the loss of expressive/receptive language communication skills. E & M PROGRESS NOTE:         HISTORY       CC:  \"I have been better\"  HISTORY OF PRESENT ILLNESS/INTERVAL HISTORY:  (reviewed/updated 8/13/2017). per initial evaluation:   The patient, Huong Armas, is a 61 y.o.   BLACK OR  male with a past psychiatric history significant for polysubstance abuse, who presents at this time with complaints of (and/or evidence of) the following emotional symptoms: depression and suicidal thoughts/threats. Additional symptomatology include alcohol abuse, anxiety, concern about health problems, difficulty sleeping, financial problems, increased irritability and \"I don't want to go on living like this\". The above symptoms have been present for few days. These symptoms are of severe severity. These symptoms are intermittent/ fleeting in nature. The patient's condition has been precipitated by and psychosocial stressors (substance use, homeless ). Patient's condition made worse by continued illicit drug use and alcohol use as well as treatment noncompliance. UDS: +MJ , cocaine; BAL=275 and 114 after few hrs    Sabine Vanegas presents/reports/evidences the following emotional symptoms today, 8/13/2017:withdrawal sx, depression. The above symptoms have been present for 1 day. These symptoms are of moderate severity. The symptoms are intermittent/ fleeting in nature. Additional symptomatology and features include poor sleep, mild w/d sx, alcohol abuse, concern about health problems and poor concentration. SIDE EFFECTS: (reviewed/updated 8/13/2017)  None reported or admitted to. ALLERGIES:(reviewed/updated 8/13/2017)  No Known Allergies   MEDICATIONS PRIOR TO ADMISSION:(reviewed/updated 8/13/2017)  Prescriptions Prior to Admission   Medication Sig    traMADol (ULTRAM) 50 mg tablet Take 1 Tab by mouth every eight (8) hours as needed for Pain for up to 10 doses. Max Daily Amount: 150 mg.    ibuprofen (MOTRIN) 800 mg tablet Take 1 Tab by mouth every eight (8) hours as needed for Pain for up to 8 doses.  diazepam (VALIUM) 5 mg tablet Take 1 Tab by mouth three (3) times daily as needed for Anxiety (spasm). Max Daily Amount: 15 mg.    methocarbamol (ROBAXIN) 500 mg tablet Take 1 Tab by mouth every six (6) hours as needed (mm spasm).  thiamine (B-1) 100 mg tablet Take 1 Tab by mouth daily.  ALPRAZolam (XANAX) 0.25 mg tablet Take 0.25 mg by mouth nightly as needed.  lisinopril (PRINIVIL, ZESTRIL) 5 mg tablet Take 5 mg by mouth daily. PAST MEDICAL HISTORY: Past medical history from the initial psychiatric evaluation has been reviewed (reviewed/updated 8/13/2017) with no additional updates (I asked patient and no additional past medical history provided). Past Medical History:   Diagnosis Date    Hypertension     Psychiatric disorder     anxiety   History reviewed. No pertinent surgical history. SOCIAL HISTORY: Social history from the initial psychiatric evaluation has been reviewed (reviewed/updated 8/13/2017) with no additional updates (I asked patient and no additional social history provided). Social History     Social History    Marital status: UNKNOWN     Spouse name: N/A    Number of children: N/A    Years of education: N/A     Occupational History    Not on file. Social History Main Topics    Smoking status: Current Some Day Smoker     Packs/day: 0.25    Smokeless tobacco: Never Used    Alcohol use 2.4 - 6.0 oz/week     4 - 10 Cans of beer per week      Comment: daily, hx of withdrawal. ?no seizure/ DTs    Drug use: Yes     Special: Marijuana      Comment: occasional THC, vague and not truthful    Sexual activity: Not on file     Other Topics Concern    Not on file     Social History Narrative    Single, 1 daughter    Currently homeless, not working, on disability for . Graduated HS. No legal charges. FAMILY HISTORY: Family history from the initial psychiatric evaluation has been reviewed (reviewed/updated 8/13/2017) with no additional updates (I asked patient and no additional family history provided).    Family History   Problem Relation Age of Onset    Alcohol abuse Father        REVIEW OF SYSTEMS: (reviewed/updated 8/13/2017)  Appetite:improved   Sleep: does not feel rested   All other Review of Systems: Psychological ROS: positive for - anxiety and depression  Cardiovascular ROS: no chest pain or dyspnea on exertion  Gastrointestinal ROS: no abdominal pain, change in bowel habits, or black or bloody stools  Neurological ROS: no TIA or stroke symptoms         [facility] (MSE):    MSE FINDINGS ARE WITHIN NORMAL LIMITS (WNL) UNLESS OTHERWISE STATED BELOW. ( ALL OF THE BELOW CATEGORIES OF THE MSE HAVE BEEN REVIEWED (reviewed 8/13/2017) AND UPDATED AS DEEMED APPROPRIATE )  General Presentation age appropriate and disheveled, cooperative and unreliable   Orientation disorganized   Vital Signs  See below (reviewed 8/13/2017); Vital Signs (BP, Pulse, & Temp) are within normal limits if not listed below.    Gait and Station Stable/steady, no ataxia   Musculoskeletal System No extrapyramidal symptoms (EPS); no abnormal muscular movements or Tardive Dyskinesia (TD); muscle strength and tone are within normal limits   Language No aphasia or dysarthria   Speech:  hypoverbal and monotone   Thought Processes logical; slow rate of thoughts; poor abstract reasoning/computation   Thought Associations goal directed   Thought Content free of delusions, free of hallucinations and preoccupations   Suicidal Ideations none   Homicidal Ideations none   Mood:  anxious    Affect:  anxious, increased in intensity and mood-congruent   Memory recent  impaired   Memory remote:  intact   Concentration/Attention:  distractable   Fund of Knowledge average   Insight:  limited   Reliability poor   Judgment:  limited          VITALS:     Patient Vitals for the past 24 hrs:   Temp Pulse Resp BP   08/13/17 1218 - (!) 105 - 102/82   08/13/17 0806 - 100 - 163/90   08/13/17 0716 97.6 °F (36.4 °C) 96 16 170/80     Wt Readings from Last 3 Encounters:   08/11/17 77.1 kg (170 lb)   09/16/16 75.3 kg (166 lb)   09/05/16 81.6 kg (180 lb)     Temp Readings from Last 3 Encounters:   08/13/17 97.6 °F (36.4 °C)   09/16/16 97.5 °F (36.4 °C)   09/05/16 97.6 °F (36.4 °C)     BP Readings from Last 3 Encounters:   08/13/17 102/82   09/16/16 136/77   09/05/16 148/84     Pulse Readings from Last 3 Encounters:   08/13/17 (!) 105   09/16/16 88   09/05/16 90            DATA     LABORATORY DATA:(reviewed/updated 8/13/2017)  Recent Results (from the past 24 hour(s))   TSH 3RD GENERATION    Collection Time: 08/13/17  5:06 AM   Result Value Ref Range    TSH 2.07 0.36 - 3.74 uIU/mL   LIPID PANEL    Collection Time: 08/13/17  5:06 AM   Result Value Ref Range    LIPID PROFILE          Cholesterol, total 296 (H) <200 MG/DL    Triglyceride 51 <150 MG/DL    HDL Cholesterol 217 MG/DL    LDL, calculated 68.8 0 - 100 MG/DL    VLDL, calculated 10.2 MG/DL    CHOL/HDL Ratio 1.4 0 - 5.0       No results found for: VALF2, VALAC, VALP, VALPR, DS6, CRBAM, CRBAMP, CARB2, XCRBAM  No results found for: LITHM   RADIOLOGY REPORTS:(reviewed/updated 8/13/2017)  No results found.        MEDICATIONS     ALL MEDICATIONS:   Current Facility-Administered Medications   Medication Dose Route Frequency    gabapentin (NEURONTIN) capsule 300 mg  300 mg Oral QHS    ziprasidone (GEODON) 20 mg in sterile water (preservative free) 1 mL injection  20 mg IntraMUSCular BID PRN    OLANZapine (ZyPREXA) tablet 5 mg  5 mg Oral Q6H PRN    benztropine (COGENTIN) tablet 2 mg  2 mg Oral BID PRN    benztropine (COGENTIN) injection 2 mg  2 mg IntraMUSCular BID PRN    zolpidem (AMBIEN) tablet 10 mg  10 mg Oral QHS PRN    acetaminophen (TYLENOL) tablet 650 mg  650 mg Oral Q4H PRN    ibuprofen (MOTRIN) tablet 400 mg  400 mg Oral Q8H PRN    magnesium hydroxide (MILK OF MAGNESIA) 400 mg/5 mL oral suspension 30 mL  30 mL Oral DAILY PRN    nicotine (NICODERM CQ) 21 mg/24 hr patch 1 Patch  1 Patch TransDERmal DAILY PRN    LORazepam (ATIVAN) tablet 2 mg  2 mg Oral Q1H PRN    LORazepam (ATIVAN) tablet 4 mg  4 mg Oral Q1H PRN    prochlorperazine (COMPAZINE) tablet 10 mg  10 mg Oral Q6H PRN    prochlorperazine (COMPAZINE) injection 10 mg  10 mg IntraMUSCular Q1E PRN    folic acid (FOLVITE) tablet 1 mg  1 mg Oral DAILY    thiamine (B-1) tablet 100 mg  100 mg Oral DAILY    multivitamin, tx-iron-ca-min (THERA-M w/ IRON) tablet 1 Tab  1 Tab Oral DAILY    lisinopril (PRINIVIL, ZESTRIL) tablet 5 mg  5 mg Oral DAILY      SCHEDULED MEDICATIONS:   Current Facility-Administered Medications   Medication Dose Route Frequency    gabapentin (NEURONTIN) capsule 300 mg  300 mg Oral QHS    folic acid (FOLVITE) tablet 1 mg  1 mg Oral DAILY    thiamine (B-1) tablet 100 mg  100 mg Oral DAILY    multivitamin, tx-iron-ca-min (THERA-M w/ IRON) tablet 1 Tab  1 Tab Oral DAILY    lisinopril (PRINIVIL, ZESTRIL) tablet 5 mg  5 mg Oral DAILY          ASSESSMENT & PLAN     DIAGNOSES REQUIRING ACTIVE TREATMENT AND MONITORING: (reviewed/updated 8/13/2017)  Patient Active Hospital Problem List:   Alcohol dependence with alcohol-induced mood disorder ([facility] [address] (9/29/2013) vs adjustment disorder    Assessment: mild w/d sx, anxious, denies active SI/HI/AVH. sig alcohol use, daily, hx of ?w/d, blackouts and elevated LFTs. Pt is in denial, has rehab in the past, low motivation, tend to minimize. Reports anxiety sx- was taking med a yr ago from Dr Tabatha Victor. Current sx flared due to pt homelessness,    Plan: detox protocol- risk of w/d is high, ciwa, inpatient rehab, Neurontin as prophylaxis   Polysubstance abuse (8/12/2017)    Assessment: alcohol, cocaine, THC use, vague of the amount he takes, episodic. Plan: rehab     R/O Malingering- sec gain, housing. In summary, Brent Dodson, is a 61 y.o.  male who presents with a severe exacerbation of the principal diagnosis of Alcohol dependence with alcohol-induced mood disorder [facility]  Patient's condition is worsening/not improving/not stable. Patient requires continued inpatient hospitalization for further stabilization, safety monitoring and medication management. I will continue to coordinate the provision of individual, milieu, occupational, group, and substance abuse therapies to address target symptoms/diagnoses as deemed appropriate for the individual patient. A coordinated, multidisplinary treatment team round was conducted with the patient (this team consists of the nurse, psychiatric unit pharmcist,  and writer). Complete current electronic health record for patient has been reviewed today including consultant notes, ancillary staff notes, nurses and psychiatric tech notes. Suicide risk assessment completed and patient deemed to be of low risk for suicide at this time. The following regarding medications was addressed during rounds with patient:   the risks and benefits of the proposed medication. The patient was given the opportunity to ask questions. Informed consent given to the use of the above medications. Will continue to adjust psychiatric and non-psychiatric medications (see above \"medication\" section and orders section for details) as deemed appropriate & based upon diagnoses and response to treatment. I will continue to order blood tests/labs and diagnostic tests as deemed appropriate and review results as they become available (see orders for details and above listed lab/test results). I will order psychiatric records from previous [facility] to further elucidate the nature of patient's psychopathology and review once available. I will gather additional collateral information from friends, family and o/p treatment team to further elucidate the nature of patient's psychopathology and baselline level of psychiatric functioning.          I certify that this patient's inpatient psychiatric hospital services furnished since the previous certification were, and continue to be, required for treatment that could reasonably be expected to improve the patient's condition, or for diagnostic study, and that the patient continues to need, on a daily basis, active treatment furnished directly by or requiring the supervision of inpatient psychiatric facility personnel. In addition, the hospital records show that services furnished were intensive treatment services, admission or related services, or equivalent services.     EXPECTED DISCHARGE DATE/DAY: TBD     DISPOSITION: Home       Signed By:   Chano Rae MD  8/13/2017

## 2017-08-13 NOTE — BH NOTES
The patient Rohit Maki is participating in Relaxation Group. Group time: 30 minutes    Personal goal for participation: personal  Goal orientation:  To learn to relax    Group therapy participation: Active    Therapeutic interventions reviewed and discussed: Yes    Alycia Gonzalez  8/12/2017

## 2017-08-14 PROCEDURE — 65220000003 HC RM SEMIPRIVATE PSYCH

## 2017-08-14 PROCEDURE — 74011250637 HC RX REV CODE- 250/637: Performed by: PSYCHIATRY & NEUROLOGY

## 2017-08-14 RX ORDER — LORAZEPAM 1 MG/1
1 TABLET ORAL 3 TIMES DAILY
Status: DISCONTINUED | OUTPATIENT
Start: 2017-08-14 | End: 2017-08-15

## 2017-08-14 RX ADMIN — GABAPENTIN 300 MG: 300 CAPSULE ORAL at 21:43

## 2017-08-14 RX ADMIN — MULTIPLE VITAMINS W/ MINERALS TAB 1 TABLET: TAB at 07:37

## 2017-08-14 RX ADMIN — Medication 100 MG: at 07:37

## 2017-08-14 RX ADMIN — ZOLPIDEM TARTRATE 10 MG: 10 TABLET, FILM COATED ORAL at 21:44

## 2017-08-14 RX ADMIN — LORAZEPAM 1 MG: 1 TABLET ORAL at 17:58

## 2017-08-14 RX ADMIN — FOLIC ACID 1 MG: 1 TABLET ORAL at 07:37

## 2017-08-14 NOTE — BH NOTES
PSYCHIATRIC PROGRESS NOTE         Patient Name  Gail Campbell   Date of Birth 1960   [facility] [id number]   Medical Record Number  [id number]      Age  61 y.o. PCP Rafy Hernandez MD   Admit date:  8/11/2017    Room Number  323/01  @ [facility]   Date of Service  8/14/2017          PSYCHOTHERAPY SESSION NOTE:  Length of psychotherapy session: 20 minutes    Main condition/diagnosis/issues treated during session today, 8/14/2017 : substance abuse, medication, w/d    I employed Cognitive Behavioral therapy techniques, Reality-Oriented psychotherapy, as well as supportive psychotherapy in regards to various ongoing psychosocial stressors, including the following: pre-admission and current problems; housing issues; occupational issues, medical issues; and stress of hospitalization. Interpersonal relationship issues and psychodynamic conflicts explored. Attempts made to alleviate maladaptive patterns. We, also, worked on issues of denial & effects of substance dependency/use     Overall, patient is progressing    Treatment Plan Update (reviewed an updated 8/14/2017) : I will modify psychotherapy tx plan by implementing more stress management strategies, building upon cognitive behavioral techniques, increasing coping skills, as well as shoring up psychological defenses). E & M PROGRESS NOTE:         HISTORY       CC:  \"I feel better\"  HISTORY OF PRESENT ILLNESS/INTERVAL HISTORY:  (reviewed/updated 8/14/2017). per initial evaluation: The patient, Gail Campbell, is a 61 y.o. BLACK OR  male with a past psychiatric history significant for polysubstance abuse, who presents at this time with complaints of (and/or evidence of) the following emotional symptoms: depression and suicidal thoughts/threats.   Additional symptomatology include alcohol abuse, anxiety, concern about health problems, difficulty sleeping, financial problems, increased irritability and \"I don't want to go on living like this\". The above symptoms have been present for few days. These symptoms are of severe severity. These symptoms are intermittent/ fleeting in nature. The patient's condition has been precipitated by and psychosocial stressors (substance use, homeless ). Patient's condition made worse by continued illicit drug use and alcohol use as well as treatment noncompliance. UDS: +MJ , cocaine; BAL=275 and 114 after few hrs    Sarah Purdy presents/reports/evidences the following emotional symptoms today, 8/14/2017:withdrawal sx, depression. The above symptoms have been present for 1 day. These symptoms are of moderate severity. The symptoms are intermittent/ fleeting in nature. Additional symptomatology and features include poor sleep, mild w/d sx, alcohol abuse, concern about health problems and poor concentration. SIDE EFFECTS: (reviewed/updated 8/14/2017)  None reported or admitted to. ALLERGIES:(reviewed/updated 8/14/2017)  No Known Allergies   MEDICATIONS PRIOR TO ADMISSION:(reviewed/updated 8/14/2017)  Prescriptions Prior to Admission   Medication Sig    traMADol (ULTRAM) 50 mg tablet Take 1 Tab by mouth every eight (8) hours as needed for Pain for up to 10 doses. Max Daily Amount: 150 mg.    ibuprofen (MOTRIN) 800 mg tablet Take 1 Tab by mouth every eight (8) hours as needed for Pain for up to 8 doses.  diazepam (VALIUM) 5 mg tablet Take 1 Tab by mouth three (3) times daily as needed for Anxiety (spasm). Max Daily Amount: 15 mg.    methocarbamol (ROBAXIN) 500 mg tablet Take 1 Tab by mouth every six (6) hours as needed (mm spasm).  thiamine (B-1) 100 mg tablet Take 1 Tab by mouth daily.  ALPRAZolam (XANAX) 0.25 mg tablet Take 0.25 mg by mouth nightly as needed.  lisinopril (PRINIVIL, ZESTRIL) 5 mg tablet Take 5 mg by mouth daily.       PAST MEDICAL HISTORY: Past medical history from the initial psychiatric evaluation has been reviewed (reviewed/updated 8/14/2017) with no additional updates (I asked patient and no additional past medical history provided). Past Medical History:   Diagnosis Date    Hypertension     Psychiatric disorder     anxiety   History reviewed. No pertinent surgical history. SOCIAL HISTORY: Social history from the initial psychiatric evaluation has been reviewed (reviewed/updated 8/14/2017) with no additional updates (I asked patient and no additional social history provided). Social History     Social History    Marital status: UNKNOWN     Spouse name: N/A    Number of children: N/A    Years of education: N/A     Occupational History    Not on file. Social History Main Topics    Smoking status: Current Some Day Smoker     Packs/day: 0.25    Smokeless tobacco: Never Used    Alcohol use 2.4 - 6.0 oz/week     4 - 10 Cans of beer per week      Comment: daily, hx of withdrawal. ?no seizure/ DTs    Drug use: Yes     Special: Marijuana      Comment: occasional THC, vague and not truthful    Sexual activity: Not on file     Other Topics Concern    Not on file     Social History Narrative    Single, 1 daughter    Currently homeless, not working, on disability for . Graduated HS. No legal charges. FAMILY HISTORY: Family history from the initial psychiatric evaluation has been reviewed (reviewed/updated 8/14/2017) with no additional updates (I asked patient and no additional family history provided).    Family History   Problem Relation Age of Onset    Alcohol abuse Father        REVIEW OF SYSTEMS: (reviewed/updated 8/14/2017)  Appetite:improved   Sleep: does not feel rested   All other Review of Systems: Psychological ROS: positive for - anxiety and depression  Cardiovascular ROS: no chest pain or dyspnea on exertion  Gastrointestinal ROS: no abdominal pain, change in bowel habits, or black or bloody stools  Neurological ROS: no TIA or stroke symptoms         [facility] ([facility] MSE FINDINGS ARE WITHIN NORMAL LIMITS (WNL) UNLESS OTHERWISE STATED BELOW. ( ALL OF THE BELOW CATEGORIES OF THE MSE HAVE BEEN REVIEWED (reviewed 8/14/2017) AND UPDATED AS DEEMED APPROPRIATE )  General Presentation age appropriate and disheveled, cooperative and unreliable   Orientation disorganized   Vital Signs  See below (reviewed 8/14/2017); Vital Signs (BP, Pulse, & Temp) are within normal limits if not listed below.    Gait and Station Stable/steady, no ataxia   Musculoskeletal System No extrapyramidal symptoms (EPS); no abnormal muscular movements or Tardive Dyskinesia (TD); muscle strength and tone are within normal limits   Language No aphasia or dysarthria   Speech:  hypoverbal and monotone   Thought Processes logical; slow rate of thoughts; poor abstract reasoning/computation   Thought Associations goal directed   Thought Content free of delusions, free of hallucinations and preoccupations   Suicidal Ideations none   Homicidal Ideations none   Mood:  anxious    Affect:  Anxious,sad to euthymic   Memory recent  impaired   Memory remote:  intact   Concentration/Attention:  distractable   Fund of Knowledge average   Insight:  limited   Reliability poor   Judgment:  limited          VITALS:     Patient Vitals for the past 24 hrs:   Temp Pulse Resp BP   08/14/17 1055 - (!) 109 - 99/74   08/14/17 0737 - (!) 135 - 101/67   08/14/17 0652 97 °F (36.1 °C) (!) 123 18 108/77     Wt Readings from Last 3 Encounters:   08/11/17 77.1 kg (170 lb)   09/16/16 75.3 kg (166 lb)   09/05/16 81.6 kg (180 lb)     Temp Readings from Last 3 Encounters:   08/14/17 97 °F (36.1 °C)   09/16/16 97.5 °F (36.4 °C)   09/05/16 97.6 °F (36.4 °C)     BP Readings from Last 3 Encounters:   08/14/17 99/74   09/16/16 136/77   09/05/16 148/84     Pulse Readings from Last 3 Encounters:   08/14/17 (!) 109   09/16/16 88   09/05/16 90            DATA     LABORATORY DATA:(reviewed/updated 8/14/2017)  No results found for this or any previous visit (from the past 24 hour(s)). No results found for: VALF2, VALAC, VALP, VALPR, DS6, CRBAM, CRBAMP, CARB2, XCRBAM  No results found for: LITHM   RADIOLOGY REPORTS:(reviewed/updated 8/14/2017)  No results found.        MEDICATIONS     ALL MEDICATIONS:   Current Facility-Administered Medications   Medication Dose Route Frequency    LORazepam (ATIVAN) tablet 1 mg  1 mg Oral TID    gabapentin (NEURONTIN) capsule 300 mg  300 mg Oral QHS    ziprasidone (GEODON) 20 mg in sterile water (preservative free) 1 mL injection  20 mg IntraMUSCular BID PRN    OLANZapine (ZyPREXA) tablet 5 mg  5 mg Oral Q6H PRN    benztropine (COGENTIN) tablet 2 mg  2 mg Oral BID PRN    benztropine (COGENTIN) injection 2 mg  2 mg IntraMUSCular BID PRN    zolpidem (AMBIEN) tablet 10 mg  10 mg Oral QHS PRN    acetaminophen (TYLENOL) tablet 650 mg  650 mg Oral Q4H PRN    ibuprofen (MOTRIN) tablet 400 mg  400 mg Oral Q8H PRN    magnesium hydroxide (MILK OF MAGNESIA) 400 mg/5 mL oral suspension 30 mL  30 mL Oral DAILY PRN    nicotine (NICODERM CQ) 21 mg/24 hr patch 1 Patch  1 Patch TransDERmal DAILY PRN    LORazepam (ATIVAN) tablet 2 mg  2 mg Oral Q1H PRN    LORazepam (ATIVAN) tablet 4 mg  4 mg Oral Q1H PRN    prochlorperazine (COMPAZINE) tablet 10 mg  10 mg Oral Q6H PRN    prochlorperazine (COMPAZINE) injection 10 mg  10 mg IntraMUSCular D5N PRN    folic acid (FOLVITE) tablet 1 mg  1 mg Oral DAILY    thiamine (B-1) tablet 100 mg  100 mg Oral DAILY    multivitamin, tx-iron-ca-min (THERA-M w/ IRON) tablet 1 Tab  1 Tab Oral DAILY    lisinopril (PRINIVIL, ZESTRIL) tablet 5 mg  5 mg Oral DAILY      SCHEDULED MEDICATIONS:   Current Facility-Administered Medications   Medication Dose Route Frequency    LORazepam (ATIVAN) tablet 1 mg  1 mg Oral TID    gabapentin (NEURONTIN) capsule 300 mg  300 mg Oral QHS    folic acid (FOLVITE) tablet 1 mg  1 mg Oral DAILY    thiamine (B-1) tablet 100 mg  100 mg Oral DAILY    multivitamin, tx-iron-ca-min (THERA-M w/ IRON) tablet 1 Tab  1 Tab Oral DAILY    lisinopril (PRINIVIL, ZESTRIL) tablet 5 mg  5 mg Oral DAILY          ASSESSMENT & PLAN     DIAGNOSES REQUIRING ACTIVE TREATMENT AND MONITORING: (reviewed/updated 8/14/2017)  Patient Active Hospital Problem List:     Alcohol dependence with alcohol-induced mood disorder ([facility] (9/29/2013) vs adjustment disorder    Assessment: mild w/d sxs today , less anxious, no sig depression,  denies active SI/HI/AVH. sig alcohol use, daily, hx of ?w/d, blackouts and elevated LFTs. Pt is in denial, has rehab in the past, low motivation, tend to minimize. Scores low to medium. + tremors. Mood fair, no s/i. Low risk of s.i    Plan: cont with benzos with detox protocol- risk of w/d is high, ciwa, inpatient rehab, Neurontin as prophylaxis. Polysubstance dep (8/12/2017)    Assessment: alcohol, cocaine, THC use, vague of the amount he takes, episodic. Plan: rehab referral--residential , last attempt greater than 6 yrs ago     R/O Malingering- highly likely due to sec gain being housing--currently homeless. Will get shelter lined up for pt. In summary, Cathe Closs, is a 61 y.o.  male who presents with a severe exacerbation of the principal diagnosis of Alcohol dependence with alcohol-induced mood disorder ([facility].)  Patient's condition is worsening/not improving/not stable. Patient requires continued inpatient hospitalization for further stabilization, safety monitoring and medication management. I will continue to coordinate the provision of individual, milieu, occupational, group, and substance abuse therapies to address target symptoms/diagnoses as deemed appropriate for the individual patient. A coordinated, multidisplinary treatment team round was conducted with the patient (this team consists of the nurse, psychiatric unit pharmcist,  and writer).      Complete current electronic health record for patient has been reviewed today including consultant notes, ancillary staff notes, nurses and psychiatric tech notes. Suicide risk assessment completed and patient deemed to be of low risk for suicide at this time. The following regarding medications was addressed during rounds with patient:   the risks and benefits of the proposed medication. The patient was given the opportunity to ask questions. Informed consent given to the use of the above medications. Will continue to adjust psychiatric and non-psychiatric medications (see above \"medication\" section and orders section for details) as deemed appropriate & based upon diagnoses and response to treatment. I will continue to order blood tests/labs and diagnostic tests as deemed appropriate and review results as they become available (see orders for details and above listed lab/test results). I will order psychiatric records from previous [facility] hospitals to further elucidate the nature of patient's psychopathology and review once available. I will gather additional collateral information from friends, family and o/p treatment team to further elucidate the nature of patient's psychopathology and baselline level of psychiatric functioning. I certify that this patient's inpatient psychiatric hospital services furnished since the previous certification were, and continue to be, required for treatment that could reasonably be expected to improve the patient's condition, or for diagnostic study, and that the patient continues to need, on a daily basis, active treatment furnished directly by or requiring the supervision of inpatient psychiatric facility personnel. In addition, the hospital records show that services furnished were intensive treatment services, admission or related services, or equivalent services.     EXPECTED DISCHARGE DATE/DAY: Qamar Mccullough     DISPOSITION: Home       Signed By:   Nader Diaz MD  8/14/2017

## 2017-08-14 NOTE — PROGRESS NOTES
Problem: Chemical Dependency (Adult/Pediatric)  Goal: *STG: Complies with medication therapy  Outcome: Progressing Towards Goal  Patient remained in his room this shift, complained of not feeling well due to detox from alcohol. CIWA 7. Complained headache, received motrin per order. Discussed withdrawal sx and explained to let staff know for further assistance. Stated understanding. States he does feel better today than yesterday. Denies thoughts of self harm or wanting to harm others. Denies hallucinations. Will continue to assess for signs of withdrawal for alcohol and anticipate needs.

## 2017-08-14 NOTE — BH NOTES
GROUP THERAPY PROGRESS NOTE    Tracy Graves is participating in Process Group. Group time: 30 minutes    Personal goal for participation: Identify use of emotional mind, rational mind, or wise mind in decision making    Goal orientation: personal    Group therapy participation: active; pt joined group mid-way through session    Therapeutic interventions reviewed and discussed:   Topic: Mindfulness and the [employer]   Pt reported that he needs to get a  to help him with sobriety. He stated that he has hit rock bottom due to drug and alcohol addiction. He stated that he usually responds to stressor with an emotional mind. Pt reported that he wants to change his behaviors because of the negative consequences in his life. Impression of participation:   Pt presented with a constricted affect and sad mood. He was an active participant and contributed to discussion. Pts behavior was appropriate and thoughts organized.     HUA Melo, Supervisee in Social Work

## 2017-08-14 NOTE — BH NOTES
GROUP THERAPY PROGRESS NOTE    The patient King vicente 61 y.o. male is participating in Coping Skills Group. Group time: 45 minutes    Personal goal for participation: To develop a personal plan for success    Goal orientation:  personal    Group therapy participation: active    Therapeutic interventions reviewed and discussed: positive coping strategies/goals    Impression of participation:  The patient was attentive.     Reshma Severe  8/14/2017  2:23 PM

## 2017-08-14 NOTE — BH NOTES
Theresa Colin    Mr. Umang Soliman is a 61year old male who was admitted due to depression, suicidal ideation and poly substance dependency. Pt's current stressors include, homelessness, financial issues, chronic etoh dependency and lack of peer/family support. Pt reported using alcohol, cocaine and marijuana with a positive UDS. Pt denies any legal history. Pt reported having completed high school. Pt is single but reported having a daughter that lives in [facility] [place] [facility] and a sister that lives locally. Pt reported to be homeless and sleeping on the streets. Patient initially claimed his two family contacts were of no support but as we discussed his care he was open to contact his sister. Patient does have some insight into the affects of his substance abuse on his life choices. Patient reported having services in the past for about 8-9 years and shared he had been diagnosed with depression. He reported being in treatment for this diagnosis until last year. Pt expressed the desire for residential rehab to address drug/etoh dependency. Mr. Umang Soliman was oriented but was shaking and seemed to have a difficult time standing while sharing pyscho social information. He denied suicidal ideation and homicidal ideation currently but stated he is very sad. Will continue to support patient towards discharge goals of connecting him with family support and etoh/drug rehab.

## 2017-08-14 NOTE — PROGRESS NOTES
Problem: Suicide/Homicide (Adult/Pediatric)  Goal: *STG: Remains safe in hospital  Outcome: Progressing Towards Goal  Patient is visible on the unit. Calm and cooperative. Attending groups and socializing with peers. Patient continues to be monitored by the withdrawl protocol. Please see CIWA. Patient has schedule ativan and blood pressure was low so medication was held. Patient does complain of some anxiety. Will continue to monitor patient and assess needs.

## 2017-08-15 VITALS
BODY MASS INDEX: 21.82 KG/M2 | WEIGHT: 170 LBS | TEMPERATURE: 98.2 F | DIASTOLIC BLOOD PRESSURE: 87 MMHG | HEIGHT: 74 IN | OXYGEN SATURATION: 96 % | RESPIRATION RATE: 18 BRPM | SYSTOLIC BLOOD PRESSURE: 148 MMHG | HEART RATE: 101 BPM

## 2017-08-15 PROBLEM — Z76.5 MALINGERING: Status: ACTIVE | Noted: 2017-08-15

## 2017-08-15 PROBLEM — F10.930 ALCOHOL WITHDRAWAL SYNDROME WITHOUT COMPLICATION (HCC): Status: ACTIVE | Noted: 2017-08-15

## 2017-08-15 PROCEDURE — 74011250637 HC RX REV CODE- 250/637: Performed by: PSYCHIATRY & NEUROLOGY

## 2017-08-15 PROCEDURE — 74011250637 HC RX REV CODE- 250/637: Performed by: INTERNAL MEDICINE

## 2017-08-15 RX ORDER — LISINOPRIL 5 MG/1
5 TABLET ORAL DAILY
Qty: 14 TAB | Refills: 1 | Status: SHIPPED | OUTPATIENT
Start: 2017-08-15

## 2017-08-15 RX ORDER — GABAPENTIN 300 MG/1
300 CAPSULE ORAL 3 TIMES DAILY
Qty: 9 CAP | Refills: 0 | Status: SHIPPED | OUTPATIENT
Start: 2017-08-15 | End: 2017-08-18

## 2017-08-15 RX ADMIN — LORAZEPAM 1 MG: 1 TABLET ORAL at 08:05

## 2017-08-15 RX ADMIN — FOLIC ACID 1 MG: 1 TABLET ORAL at 08:05

## 2017-08-15 RX ADMIN — LISINOPRIL 5 MG: 5 TABLET ORAL at 08:05

## 2017-08-15 RX ADMIN — Medication 100 MG: at 08:05

## 2017-08-15 RX ADMIN — MULTIPLE VITAMINS W/ MINERALS TAB 1 TABLET: TAB at 08:05

## 2017-08-15 NOTE — BH NOTES
Social Work     Pt will be discharged today. Pt referred to [facility] for [facility] services. Pt was transported by hospital transportation. Pt is alert and oriented. Pt denies SI/HI. Pt is free of delusions. Pt's mood is euthymic. Pt's thought process is logical.  Pt is in agreement with the plan.     Follow-up  [address] [facility] [address]  [phone]  Walk-in appointment 8/16/17 @ 7:30am for mental health assessment and services and substance abuse services   [facility] care plan was faxed

## 2017-08-15 NOTE — BH NOTES
Theresa Colin    Mr. Merlinda Patient sister was unable to pick him up upon discharge. Patient given [facility] bus pass for transportation.

## 2017-08-15 NOTE — DISCHARGE SUMMARY
PSYCHIATRIC DISCHARGE SUMMARY         IDENTIFICATION:    Patient Name  Kenneth Gerardo   Date of Birth 1960   [facility] [id number]   Medical Record Number  [id number]      Age  61 y.o. PCP Jerome Coy MD   Admit date:  8/11/2017    Discharge date: 8/15/2017   Room Number  323/01  @ [facility]   Date of Service  8/15/2017            TYPE OF DISCHARGE: REGULAR               CONDITION AT DISCHARGE: good       PROVISIONAL & DISCHARGE DIAGNOSES:    Problem List  Date Reviewed: 9/29/2013          Codes Class    Malingering ICD-10-CM: Z76.5  ICD-9-CM: V65.2         Alcohol withdrawal syndrome without complication ([facility] [address] MMG-58-AA: F10.230  ICD-9-CM: 291.81         Depressive disorder ICD-10-CM: F32.9  ICD-9-CM: 311         Polysubstance abuse ICD-10-CM: F19.10  ICD-9-CM: 305.90         ERRONEOUS ENCOUNTER--DISREGARD ICD-9-CM: 45552         Hyponatremia ICD-10-CM: E87.1  ICD-9-CM: 276.1         Fall ICD-10-CM: W19. XXXA  ICD-9-CM: E888.9         HTN (hypertension) (Chronic) ICD-10-CM: I10  ICD-9-CM: 401.9         ARF (acute renal failure) (HCC) ICD-10-CM: N17.9  ICD-9-CM: 584.9         Occipital scalp laceration ICD-10-CM: S01. 01XA  ICD-9-CM: 873.0 Present on Admission        * (Principal)Alcohol dependence with alcohol-induced mood disorder (HCC) (Chronic) ICD-10-CM: F10.24  ICD-9-CM: 303.90, 291.89 Chronic        Cocaine abuse ICD-10-CM: F14.10  ICD-9-CM: 305.60 Present on Admission              Active Hospital Problems    Malingering      [facility]      Polysubstance abuse      *Alcohol dependence with alcohol-induced mood disorder (HCC)      HTN (hypertension)        DISCHARGE DIAGNOSIS:   Axis I:  SEE ABOVE  Axis II: SEE ABOVE  Axis III: SEE ABOVE  Axis IV:  lack of structure  Axis V:  50 on admission, 65 on discharge (baseline)       CC & HISTORY OF PRESENT ILLNESS:   \"The patient, Kenneth Gerardo, is a 61 y.o.   BLACK OR  male with a past psychiatric history significant for polysubstance abuse, who presents at this time with complaints of (and/or evidence of) the following emotional symptoms: depression and suicidal thoughts/threats. Additional symptomatology include alcohol abuse, anxiety, concern about health problems, difficulty sleeping, financial problems, increased irritability and \"I don't want to go on living like this\". The above symptoms have been present for few days. These symptoms are of severe severity. These symptoms are intermittent/ fleeting in nature. The patient's condition has been precipitated by and psychosocial stressors (substance use, homeless ). Patient's condition made worse by continued illicit drug use and alcohol use as well as treatment noncompliance. UDS: +MJ , cocaine; BAL=275 . \"       SOCIAL HISTORY:    Social History     Social History    Marital status: UNKNOWN     Spouse name: N/A    Number of children: N/A    Years of education: N/A     Occupational History    Not on file. Social History Main Topics    Smoking status: Current Some Day Smoker     Packs/day: 0.25    Smokeless tobacco: Never Used    Alcohol use 2.4 - 6.0 oz/week     4 - 10 Cans of beer per week      Comment: daily, hx of withdrawal. ?no seizure/ DTs    Drug use: Yes     Special: Marijuana      Comment: occasional THC, vague and not truthful    Sexual activity: Not on file     Other Topics Concern    Not on file     Social History Narrative    Single, 1 daughter    Currently homeless, not working, on disability for . Graduated HS. No legal charges. FAMILY HISTORY:   Family History   Problem Relation Age of Onset    Alcohol abuse Father              HOSPITALIZATION COURSE:    Obed Sanchez was admitted to the inpatient psychiatric unit [facility] for acute psychiatric stabilization in regards to symptomatology as described in the HPI above.    While on the unit Obed Sanchez was involved in individual, group, occupational and milieu therapy. Psychiatric medications were adjusted during this hospitalization including ativan for detox purposes. Vish Tesfaye demonstrated a progressive improvement in overall condition. Much of patient's depression appeared to be related to situational stressors, malingered, effects of drugs of abuse, and psychological factors. Please see individual progress notes for more specific details regarding patient's hospitalization course. At time of discharge, Vish Tesfaye is without significant problems of depression. Patient free of suicidal and homicidal ideations (appears to be at very low risk of suicide or homicide) and reports many positive predictive factors in terms of not attempting suicide or homicide. Patient has maximized benefit to be derived from acute inpatient psychiatric treatment. All members of the treatment team concur with each other in regards to plans for discharge today . Patient aware and in agreement with discharge and discharge plan. Per my last note:   Alcohol dependence with alcohol-induced mood disorder ([facility] [address] (9/29/2013) vs adjustment disorder    Assessment: mild w/d sxs today , less anxious, no sig depression,  denies active SI/HI/AVH. sig alcohol use, daily, hx of ?w/d, blackouts and elevated LFTs. Pt is in denial, has rehab in the past, low motivation, tend to minimize. Scores low to medium. + tremors. Mood fair, no s/i. Low risk of s.i    Plan: cont with benzos with detox protocol- risk of w/d is high, ciwa, inpatient rehab, Neurontin as prophylaxis. Polysubstance dep (8/12/2017)    Assessment: alcohol, cocaine, THC use, vague of the amount he takes, episodic. Plan: rehab referral--residential , last attempt greater than 6 yrs ago     R/O Malingering- highly likely due to sec gain being housing--currently homeless. Will get shelter lined up for pt.          LABS AND IMAGAING:    Labs Reviewed   CBC WITH AUTOMATED DIFF - Abnormal; Notable for the following:        Result Value    RBC 3.58 (*)     HGB 11.6 (*)     HCT 33.4 (*)     All other components within normal limits   DRUG SCREEN, URINE - Abnormal; Notable for the following:     COCAINE POSITIVE (*)     THC (TH-CANNABINOL) POSITIVE (*)     All other components within normal limits   ETHYL ALCOHOL - Abnormal; Notable for the following:     ALCOHOL(ETHYL),SERUM 275 (*)     All other components within normal limits   METABOLIC PANEL, COMPREHENSIVE - Abnormal; Notable for the following:     Sodium 134 (*)     BUN/Creatinine ratio 7 (*)     ALT (SGPT) 81 (*)     AST (SGOT) 150 (*)     Protein, total 8.4 (*)     Globulin 4.5 (*)     A-G Ratio 0.9 (*)     All other components within normal limits   ETHYL ALCOHOL - Abnormal; Notable for the following:     ALCOHOL(ETHYL),SERUM 114 (*)     All other components within normal limits   LIPID PANEL - Abnormal; Notable for the following:     Cholesterol, total 296 (*)     All other components within normal limits   URINALYSIS W/ REFLEX CULTURE   TSH 3RD GENERATION     No results found for: DS35, PHEN, PHENO, PHENT, DILF, DS39, PHENY, PTN, VALF2, VALAC, VALP, VALPR, DS6, CRBAM, CRBAMP, CARB2, XCRBAM  Admission on 08/11/2017   Component Date Value Ref Range Status    WBC 08/11/2017 6.3  4.1 - 11.1 K/uL Final    RBC 08/11/2017 3.58* 4.10 - 5.70 M/uL Final    HGB 08/11/2017 11.6* 12.1 - 17.0 g/dL Final    HCT 08/11/2017 33.4* 36.6 - 50.3 % Final    MCV 08/11/2017 93.3  80.0 - 99.0 FL Final    MCH 08/11/2017 32.4  26.0 - 34.0 PG Final    MCHC 08/11/2017 34.7  30.0 - 36.5 g/dL Final    RDW 08/11/2017 13.3  11.5 - 14.5 % Final    PLATELET 01/06/3741 972  150 - 400 K/uL Final    NEUTROPHILS 08/11/2017 49  32 - 75 % Final    LYMPHOCYTES 08/11/2017 36  12 - 49 % Final    MONOCYTES 08/11/2017 11  5 - 13 % Final    EOSINOPHILS 08/11/2017 3  0 - 7 % Final    BASOPHILS 08/11/2017 1  0 - 1 % Final    ABS.  NEUTROPHILS 08/11/2017 3.1  1.8 - 8.0 K/UL Final    ABS. LYMPHOCYTES 08/11/2017 2.3  0.8 - 3.5 K/UL Final    ABS. MONOCYTES 08/11/2017 0.7  0.0 - 1.0 K/UL Final    ABS. EOSINOPHILS 08/11/2017 0.2  0.0 - 0.4 K/UL Final    ABS. BASOPHILS 08/11/2017 0.1  0.0 - 0.1 K/UL Final    AMPHETAMINES 08/11/2017 NEGATIVE   NEG   Final    BARBITURATES 08/11/2017 NEGATIVE   NEG   Final    BENZODIAZEPINE 08/11/2017 NEGATIVE   NEG   Final    COCAINE 08/11/2017 POSITIVE* NEG   Final    METHADONE 08/11/2017 NEGATIVE   NEG   Final    OPIATES 08/11/2017 NEGATIVE   NEG   Final    PCP(PHENCYCLIDINE) 08/11/2017 NEGATIVE   NEG   Final    THC (TH-CANNABINOL) 08/11/2017 POSITIVE* NEG   Final    Drug screen comment 08/11/2017 (NOTE)   Final    ALCOHOL(ETHYL),SERUM 08/11/2017 275* <10 MG/DL Final    Sodium 08/11/2017 134* 136 - 145 mmol/L Final    Potassium 08/11/2017 4.2  3.5 - 5.1 mmol/L Final    Chloride 08/11/2017 97  97 - 108 mmol/L Final    CO2 08/11/2017 27  21 - 32 mmol/L Final    Anion gap 08/11/2017 10  5 - 15 mmol/L Final    Glucose 08/11/2017 89  65 - 100 mg/dL Final    BUN 08/11/2017 9  6 - 20 MG/DL Final    Creatinine 08/11/2017 1.22  0.70 - 1.30 MG/DL Final    BUN/Creatinine ratio 08/11/2017 7* 12 - 20   Final    GFR est AA 08/11/2017 >60  >60 ml/min/1.73m2 Final    GFR est non-AA 08/11/2017 >60  >60 ml/min/1.73m2 Final    Calcium 08/11/2017 8.8  8.5 - 10.1 MG/DL Final    Bilirubin, total 08/11/2017 0.6  0.2 - 1.0 MG/DL Final    ALT (SGPT) 08/11/2017 81* 12 - 78 U/L Final    AST (SGOT) 08/11/2017 150* 15 - 37 U/L Final    Alk.  phosphatase 08/11/2017 53  45 - 117 U/L Final    Protein, total 08/11/2017 8.4* 6.4 - 8.2 g/dL Final    Albumin 08/11/2017 3.9  3.5 - 5.0 g/dL Final    Globulin 08/11/2017 4.5* 2.0 - 4.0 g/dL Final    A-G Ratio 08/11/2017 0.9* 1.1 - 2.2   Final    Color 08/11/2017 YELLOW/STRAW    Final    Appearance 08/11/2017 CLEAR  CLEAR   Final    Specific gravity 08/11/2017 <1.005  1.003 - 1.030 Final    pH (UA) 08/11/2017 5.0  5.0 - 8.0   Final    Protein 08/11/2017 NEGATIVE   NEG mg/dL Final    Glucose 08/11/2017 NEGATIVE   NEG mg/dL Final    Ketone 08/11/2017 NEGATIVE   NEG mg/dL Final    Bilirubin 08/11/2017 NEGATIVE   NEG   Final    Blood 08/11/2017 NEGATIVE   NEG   Final    Urobilinogen 08/11/2017 0.2  0.2 - 1.0 EU/dL Final    Nitrites 08/11/2017 NEGATIVE   NEG   Final    Leukocyte Esterase 08/11/2017 NEGATIVE   NEG   Final    WBC 08/11/2017 0-4  0 - 4 /hpf Final    RBC 08/11/2017 0-5  0 - 5 /hpf Final    Epithelial cells 08/11/2017 FEW  FEW /lpf Final    Bacteria 08/11/2017 NEGATIVE   NEG /hpf Final    UA:UC IF INDICATED 08/11/2017 CULTURE NOT INDICATED BY UA RESULT  CNI   Final    ALCOHOL(ETHYL),SERUM 08/12/2017 114* <10 MG/DL Final    TSH 08/13/2017 2.07  0.36 - 3.74 uIU/mL Final    LIPID PROFILE 08/13/2017        Final    Cholesterol, total 08/13/2017 296* <200 MG/DL Final    Triglyceride 08/13/2017 51  <150 MG/DL Final    HDL Cholesterol 08/13/2017 217  MG/DL Final    LDL, calculated 08/13/2017 68.8  0 - 100 MG/DL Final    VLDL, calculated 08/13/2017 10.2  MG/DL Final    CHOL/HDL Ratio 08/13/2017 1.4  0 - 5.0   Final     No results found. DISPOSITION:    Shelter/residential rehab facility. Patient to f/u with  drug/etoh rehabilitation, and psychotherapy appointments. Patient is to f/u with internist as directed. FOLLOW-UP CARE:    Activity as tolerated  Resume previous diet  Wound Care: none needed. Follow-up Information     Follow up With Details Comments [id number] Jason CALIXTO MD   [address]  [phone]                   PROGNOSIS:   Levell Salcedo / Jc Molina ---- based on nature of patient's pathology/ies and treatment compliance issues.   Prognosis is greatly dependent upon patient's ability to remain sober and to follow up with drug/etoh rehabilitation and psychotherapy appointments as well as to comply with psychiatric medications as prescribed. DISCHARGE MEDICATIONS:    Informed consent given for the use of following psychotropic medications:  Current Discharge Medication List      START taking these medications    Details   gabapentin (NEURONTIN) 300 mg capsule Take 1 Cap by mouth three (3) times daily for 3 days. Indications: alcohol withdrawal sxs  Qty: 9 Cap, Refills: 0         CONTINUE these medications which have CHANGED    Details   lisinopril (PRINIVIL, ZESTRIL) 5 mg tablet Take 1 Tab by mouth daily. Indications: hypertension  Qty: 14 Tab, Refills: 1         STOP taking these medications       traMADol (ULTRAM) 50 mg tablet Comments:   Reason for Stopping:         ibuprofen (MOTRIN) 800 mg tablet Comments:   Reason for Stopping:         diazepam (VALIUM) 5 mg tablet Comments:   Reason for Stopping:         methocarbamol (ROBAXIN) 500 mg tablet Comments:   Reason for Stopping:         thiamine (B-1) 100 mg tablet Comments:   Reason for Stopping:         ALPRAZolam (XANAX) 0.25 mg tablet Comments:   Reason for Stopping:                      A coordinated, multidisplinary treatment team round was conducted with [employer]---this is done daily here at [facility]. This team consists of the nurse, psychiatric unit pharmcist,  and writer. I have spent greater than 35 minutes on discharge work.     Signed:  Brandon Agrawal MD  8/15/2017

## 2017-08-15 NOTE — BH NOTES
Pt resting quietly in bed with eyes closed, respiration even and unlabored, no sign of any distress at this time. Pt will continue to be monitored per hospital protocol for safety and needs. Pt slept 8 hours without any problem.

## 2017-08-15 NOTE — BH NOTES
Patient was discharged today. Patient stated his sister was going to pick him up, but when [place] Green spoke to his sister, she said she had not spoken to him and that she was unable to pick him. The  had arranged hospital transportation to take patient to his destination and canceled it when he insisted that his sister was coming. Patient was given a bus ticket. Discharge forms were signed and given to patient after being verified by second nurse and patient. Discharge instructions were explained to patient and patient indicated understanding verbally. Patient has been compliant with taking medications and stated understanding the importance of taking medications upon discharge. Patient was given prescriptions and belongings, and was escorted from the unit by staff.

## 2017-08-15 NOTE — DISCHARGE INSTRUCTIONS
DISCHARGE SUMMARY from Nurse    The following personal items are in your possession at time of discharge:    Dental Appliances: None  Visual Aid: None     Home Medications: None  Jewelry: None  Clothing: Belt, Footwear, Hat, Jacket/Coat, Pants, Shirt  Other Valuables:  (sent ot safe)             PATIENT INSTRUCTIONS:          What to do at Home:  Recommended activity: Activity as tolerated,     If I feel that I can not keep these promises and I am at risk of hurting myself or others, I will call the crisis office and speak with a crisis worker who will assist me during my crisis. [facility]  [address] [facility] [address] [facility]  [phone]       *  Please give a list of your current medications to your Primary Care Provider. *  Please update this list whenever your medications are discontinued, doses are      changed, or new medications (including over-the-counter products) are added. *  Please carry medication information at all times in case of emergency situations. These are general instructions for a healthy lifestyle:    No smoking/ No tobacco products/ Avoid exposure to second hand smoke    Surgeon General's Warning:  Quitting smoking now greatly reduces serious risk to your health. Obesity, smoking, and sedentary lifestyle greatly increases your risk for illness    A healthy diet, regular physical exercise & weight monitoring are important for maintaining a healthy lifestyle      Recognize signs and symptoms of STROKE:    F-face looks uneven    A-arms unable to move or move unevenly    S-speech slurred or non-existent    T-time-call 911 as soon as signs and symptoms begin-DO NOT go       Back to bed or wait to see if you get better-TIME IS BRAIN. Warning Signs of HEART ATTACK     Call 911 if you have these symptoms:   Chest discomfort.  Most heart attacks involve discomfort in the center of the chest that lasts more than a few minutes, or that goes away and comes back. It can feel like uncomfortable pressure, squeezing, fullness, or pain.  Discomfort in other areas of the upper body. Symptoms can include pain or discomfort in one or both arms, the back, neck, jaw, or stomach.  Shortness of breath with or without chest discomfort.  Other signs may include breaking out in a cold sweat, nausea, or lightheadedness. Don't wait more than five minutes to call 911 - MINUTES MATTER! Fast action can save your life. Calling 911 is almost always the fastest way to get lifesaving treatment. Emergency Medical Services staff can begin treatment when they arrive -- up to an hour sooner than if someone gets to the hospital by car. The discharge information has been reviewed with the patient. The patient verbalized understanding. Discharge medications reviewed with the patient and appropriate educational materials and side effects teaching were provided.

## 2017-08-15 NOTE — BH NOTES
GROUP THERAPY PROGRESS NOTE    Jeniffer Malin is participating in [place].      Group time: 30 minutes    Personal goal for participation: daily orientation    Goal orientation: personal    Group therapy participation: minimal    Therapeutic interventions reviewed and discussed: yes    Impression of participation: needed prompting

## 2017-08-15 NOTE — PROGRESS NOTES
Problem: Suicide/Homicide (Adult/Pediatric)  Goal: *STG: Remains safe in hospital  Outcome: Progressing Towards Goal  Pt alert and orient x 3, pt has been isolated to his room most of the time during the shift with dull affect and depressed mood. Pt was food and med compliant, Pt [facility] Pt's CIWA on this shift is 3, pt denied any S/I and H/I at this time. Pt was encouraged to attend all group activities and to discuss any issues or concerns with staff. Staff will also continue to monitor pt with Q -15 checks for safety.

## 2017-08-15 NOTE — BH NOTES
The patient did attend community meeting and was able to stay the entire rime. Verbalized about being homeless before admission and did not have any help when he was out the hospital.Writer encouraged patient to talk with his doctor and the  about these issues when he is in treatment team meeting. At the present time,patient shows no signs of distress or anxiety,behavior is appropriate,remains on Q 15 minute checks for safety.

## 2018-02-22 ENCOUNTER — HOSPITAL ENCOUNTER (EMERGENCY)
Age: 58
Discharge: HOME | End: 2018-02-22
Attending: EMERGENCY MEDICINE
Payer: MEDICAID

## 2018-02-22 VITALS
HEART RATE: 93 BPM | BODY MASS INDEX: 21.87 KG/M2 | HEIGHT: 73 IN | OXYGEN SATURATION: 100 % | SYSTOLIC BLOOD PRESSURE: 149 MMHG | WEIGHT: 165 LBS | TEMPERATURE: 97.9 F | DIASTOLIC BLOOD PRESSURE: 130 MMHG | RESPIRATION RATE: 18 BRPM

## 2018-02-22 DIAGNOSIS — F10.10 ALCOHOL ABUSE: Primary | ICD-10-CM

## 2018-02-22 DIAGNOSIS — F10.920 ALCOHOLIC INTOXICATION WITHOUT COMPLICATION (HCC): ICD-10-CM

## 2018-02-22 LAB
AMPHET UR QL SCN: NEGATIVE
ANION GAP SERPL CALC-SCNC: 14 MMOL/L (ref 5–15)
APPEARANCE UR: CLEAR
BACTERIA URNS QL MICRO: NEGATIVE /HPF
BARBITURATES UR QL SCN: NEGATIVE
BASOPHILS # BLD: 0.1 K/UL (ref 0–0.1)
BASOPHILS NFR BLD: 1 % (ref 0–1)
BENZODIAZ UR QL: NEGATIVE
BILIRUB UR QL: NEGATIVE
BUN SERPL-MCNC: 16 MG/DL (ref 6–20)
BUN/CREAT SERPL: 13 (ref 12–20)
CALCIUM SERPL-MCNC: 8.7 MG/DL (ref 8.5–10.1)
CANNABINOIDS UR QL SCN: NEGATIVE
CHLORIDE SERPL-SCNC: 99 MMOL/L (ref 97–108)
CO2 SERPL-SCNC: 20 MMOL/L (ref 21–32)
COCAINE UR QL SCN: POSITIVE
COLOR UR: NORMAL
CREAT SERPL-MCNC: 1.19 MG/DL (ref 0.7–1.3)
DIFFERENTIAL METHOD BLD: ABNORMAL
DRUG SCRN COMMENT,DRGCM: ABNORMAL
EOSINOPHIL # BLD: 0.2 K/UL (ref 0–0.4)
EOSINOPHIL NFR BLD: 3 % (ref 0–7)
EPITH CASTS URNS QL MICRO: NORMAL /LPF
ERYTHROCYTE [DISTWIDTH] IN BLOOD BY AUTOMATED COUNT: 13.4 % (ref 11.5–14.5)
ETHANOL SERPL-MCNC: 372 MG/DL
GLUCOSE SERPL-MCNC: 89 MG/DL (ref 65–100)
GLUCOSE UR STRIP.AUTO-MCNC: NEGATIVE MG/DL
HCT VFR BLD AUTO: 34.3 % (ref 36.6–50.3)
HGB BLD-MCNC: 11.8 G/DL (ref 12.1–17)
HGB UR QL STRIP: NEGATIVE
IMM GRANULOCYTES # BLD: 0 K/UL (ref 0–0.04)
IMM GRANULOCYTES NFR BLD AUTO: 0 % (ref 0–0.5)
KETONES UR QL STRIP.AUTO: NEGATIVE MG/DL
LEUKOCYTE ESTERASE UR QL STRIP.AUTO: NEGATIVE
LYMPHOCYTES # BLD: 2.7 K/UL (ref 0.8–3.5)
LYMPHOCYTES NFR BLD: 41 % (ref 12–49)
MCH RBC QN AUTO: 32.2 PG (ref 26–34)
MCHC RBC AUTO-ENTMCNC: 34.4 G/DL (ref 30–36.5)
MCV RBC AUTO: 93.7 FL (ref 80–99)
METHADONE UR QL: NEGATIVE
MONOCYTES # BLD: 0.7 K/UL (ref 0–1)
MONOCYTES NFR BLD: 11 % (ref 5–13)
NEUTS SEG # BLD: 2.9 K/UL (ref 1.8–8)
NEUTS SEG NFR BLD: 44 % (ref 32–75)
NITRITE UR QL STRIP.AUTO: NEGATIVE
NRBC # BLD: 0 K/UL (ref 0–0.01)
NRBC BLD-RTO: 0 PER 100 WBC
OPIATES UR QL: NEGATIVE
PCP UR QL: NEGATIVE
PH UR STRIP: 5.5 [PH] (ref 5–8)
PLATELET # BLD AUTO: 230 K/UL (ref 150–400)
PMV BLD AUTO: 9.3 FL (ref 8.9–12.9)
POTASSIUM SERPL-SCNC: 3.7 MMOL/L (ref 3.5–5.1)
PROT UR STRIP-MCNC: NEGATIVE MG/DL
RBC # BLD AUTO: 3.66 M/UL (ref 4.1–5.7)
RBC #/AREA URNS HPF: NORMAL /HPF (ref 0–5)
SODIUM SERPL-SCNC: 133 MMOL/L (ref 136–145)
SP GR UR REFRACTOMETRY: <1.005 (ref 1–1.03)
UA: UC IF INDICATED,UAUC: NORMAL
UROBILINOGEN UR QL STRIP.AUTO: 0.2 EU/DL (ref 0.2–1)
WBC # BLD AUTO: 6.5 K/UL (ref 4.1–11.1)
WBC URNS QL MICRO: NORMAL /HPF (ref 0–4)

## 2018-02-22 PROCEDURE — 99284 EMERGENCY DEPT VISIT MOD MDM: CPT

## 2018-02-22 PROCEDURE — 80307 DRUG TEST PRSMV CHEM ANLYZR: CPT | Performed by: EMERGENCY MEDICINE

## 2018-02-22 PROCEDURE — 90791 PSYCH DIAGNOSTIC EVALUATION: CPT

## 2018-02-22 PROCEDURE — 36415 COLL VENOUS BLD VENIPUNCTURE: CPT | Performed by: EMERGENCY MEDICINE

## 2018-02-22 PROCEDURE — 81001 URINALYSIS AUTO W/SCOPE: CPT | Performed by: EMERGENCY MEDICINE

## 2018-02-22 PROCEDURE — 85025 COMPLETE CBC W/AUTO DIFF WBC: CPT | Performed by: EMERGENCY MEDICINE

## 2018-02-22 PROCEDURE — 80048 BASIC METABOLIC PNL TOTAL CA: CPT | Performed by: EMERGENCY MEDICINE

## 2018-02-22 RX ORDER — ALPRAZOLAM 0.5 MG/1
0.5 TABLET ORAL
COMMUNITY

## 2018-02-22 NOTE — ED NOTES
Pt arrived to ED ambulatory with c/o mental health problem. Pt states \"I had an anxiety attack yesterday. I am out of my Xanax. I was admitted here a year ago. I'll be honest. I am homeless and I need some rest.\" Pt denies SI/HI. Pt denies delusions or hallucinations. Pt is pleasant and is very cooperative. Pt is alert and orientated X 4; skin is intact; lungs are clear; pt breathes well on room air; Pt is in no acute distress. Will continue to monitor. See nursing assessment. Safety precautions in place; call light within reach. Emergency Department Nursing Plan of Care       The Nursing Plan of Care is developed from the Nursing assessment and Emergency Department Attending provider initial evaluation. The plan of care may be reviewed in the ED Provider note.     The Plan of Care was developed with the following considerations:   Patient / Family readiness to learn indicated by:verbalized understanding  Persons(s) to be included in education: patient  Barriers to Learning/Limitations:No    [address], RN    2/22/2018   2:45 AM

## 2018-02-22 NOTE — ED PROVIDER NOTES
EMERGENCY DEPARTMENT HISTORY AND PHYSICAL EXAM      Date: 2/22/2018  Patient Name: Maribell Bui    History of Presenting Illness     Chief Complaint   Patient presents with   [facility] Mental Health Problem     Pt arrives with c/o anxiety. Pt states \"i just need help. i am homeless and need some rest.\" strong smell of etoh noted. Denies SI/HI       History Provided By: Patient    HPI: Maribell Bui, 61 y.o. male with PMHx significant for anxiety, HTN who presents ambulatory to the ED with cc of in need of a mental health evaluation. Pt reports having a panic attack yesterday. He denies use of medication to modify his symptoms. Pt denies any associated symptoms. He reports that he is homeless and needs to speak to a consular. Pt admit to consuming EtOH yesterday. He denies any SI, HI, hallucinations, abdominal pain, CP, or SOB. + tobacco use (0.25 ppd), + EtOH use, + Illicit drug use    PCP: Primo Roman MD    There are no other complaints, changes, or physical findings at this time. Current Outpatient Prescriptions   Medication Sig Dispense Refill    ALPRAZolam (XANAX) 0.5 mg tablet Take 0.5 mg by mouth.  lisinopril (PRINIVIL, ZESTRIL) 5 mg tablet Take 1 Tab by mouth daily. Indications: hypertension 14 Tab 1       Past History     Past Medical History:  Past Medical History:   Diagnosis Date    Hypertension     Psychiatric disorder     anxiety       Past Surgical History:  History reviewed. No pertinent surgical history.     Family History:  Family History   Problem Relation Age of Onset    Alcohol abuse Father        Social History:  Social History   Substance Use Topics    Smoking status: Current Some Day Smoker     Packs/day: 0.25    Smokeless tobacco: Never Used    Alcohol use 2.4 - 6.0 oz/week     4 - 10 Cans of beer per week      Comment: daily, hx of withdrawal. ?no seizure/ DTs       Allergies:  No Known Allergies      Review of Systems   Review of Systems   Constitutional: Negative for chills and fever. HENT: Negative for congestion, rhinorrhea, sneezing and sore throat. Eyes: Negative for redness and visual disturbance. Respiratory: Negative for shortness of breath. Cardiovascular: Negative for chest pain and leg swelling. Gastrointestinal: Negative for abdominal pain, nausea and vomiting. Genitourinary: Negative for difficulty urinating and frequency. Musculoskeletal: Negative for back pain, myalgias and neck stiffness. Skin: Negative for rash. Neurological: Negative for dizziness, syncope, weakness and headaches. Hematological: Negative for adenopathy. Psychiatric/Behavioral: Negative for hallucinations and suicidal ideas. - HI   All other systems reviewed and are negative. Physical Exam   Physical Exam   Constitutional: He is oriented to person, place, and time. He appears well-developed and well-nourished. No distress. Smells of homelessness   HENT:   Head: Normocephalic and atraumatic. Mouth/Throat: Oropharynx is clear and moist. No oropharyngeal exudate or posterior oropharyngeal erythema. Neck: Normal range of motion and full passive range of motion without pain. Neck supple. Cardiovascular: Normal rate, regular rhythm, normal heart sounds, intact distal pulses and normal pulses. Exam reveals no gallop and no friction rub. No murmur heard. Pulmonary/Chest: Effort normal and breath sounds normal. No accessory muscle usage. No respiratory distress. He has no decreased breath sounds. He has no wheezes. He has no rhonchi. He has no rales. Abdominal: Soft. Bowel sounds are normal. He exhibits no distension. There is no tenderness. There is no rebound, no guarding and no CVA tenderness. Musculoskeletal: Normal range of motion. He exhibits no edema or tenderness. Thoracic back: He exhibits no tenderness and no bony tenderness. Lumbar back: He exhibits no tenderness and no bony tenderness.    Lymphadenopathy:     He has no cervical adenopathy. Neurological: He is alert and oriented to person, place, and time. He has normal strength. He is not disoriented. No cranial nerve deficit or sensory deficit. No focal deficits; 5/5 muscle strength in all extremities   Skin: Skin is warm. No lesion and no rash noted. Rash is not nodular. He is not diaphoretic. Nursing note and vitals reviewed. Diagnostic Study Results     Labs -     Recent Results (from the past 12 hour(s))   CBC WITH AUTOMATED DIFF    Collection Time: 02/22/18  2:57 AM   Result Value Ref Range    WBC 6.5 4.1 - 11.1 K/uL    RBC 3.66 (L) 4.10 - 5.70 M/uL    HGB 11.8 (L) 12.1 - 17.0 g/dL    HCT 34.3 (L) 36.6 - 50.3 %    MCV 93.7 80.0 - 99.0 FL    MCH 32.2 26.0 - 34.0 PG    MCHC 34.4 30.0 - 36.5 g/dL    RDW 13.4 11.5 - 14.5 %    PLATELET 404 464 - 244 K/uL    MPV 9.3 8.9 - 12.9 FL    NRBC 0.0 0  WBC    ABSOLUTE NRBC 0.00 0.00 - 0.01 K/uL    NEUTROPHILS 44 32 - 75 %    LYMPHOCYTES 41 12 - 49 %    MONOCYTES 11 5 - 13 %    EOSINOPHILS 3 0 - 7 %    BASOPHILS 1 0 - 1 %    IMMATURE GRANULOCYTES 0 0.0 - 0.5 %    ABS. NEUTROPHILS 2.9 1.8 - 8.0 K/UL    ABS. LYMPHOCYTES 2.7 0.8 - 3.5 K/UL    ABS. MONOCYTES 0.7 0.0 - 1.0 K/UL    ABS. EOSINOPHILS 0.2 0.0 - 0.4 K/UL    ABS. BASOPHILS 0.1 0.0 - 0.1 K/UL    ABS. IMM.  GRANS. 0.0 0.00 - 0.04 K/UL    DF AUTOMATED     METABOLIC PANEL, BASIC    Collection Time: 02/22/18  2:57 AM   Result Value Ref Range    Sodium 133 (L) 136 - 145 mmol/L    Potassium 3.7 3.5 - 5.1 mmol/L    Chloride 99 97 - 108 mmol/L    CO2 20 (L) 21 - 32 mmol/L    Anion gap 14 5 - 15 mmol/L    Glucose 89 65 - 100 mg/dL    BUN 16 6 - 20 MG/DL    Creatinine 1.19 0.70 - 1.30 MG/DL    BUN/Creatinine ratio 13 12 - 20      GFR est AA >60 >60 ml/min/1.73m2    GFR est non-AA >60 >60 ml/min/1.73m2    Calcium 8.7 8.5 - 10.1 MG/DL   DRUG SCREEN, URINE    Collection Time: 02/22/18  2:57 AM   Result Value Ref Range    AMPHETAMINES NEGATIVE  NEG      BARBITURATES NEGATIVE  NEG BENZODIAZEPINES NEGATIVE  NEG      COCAINE POSITIVE (A) NEG      METHADONE NEGATIVE  NEG      OPIATES NEGATIVE  NEG      PCP(PHENCYCLIDINE) NEGATIVE  NEG      THC (TH-CANNABINOL) NEGATIVE  NEG      Drug screen comment (NOTE)    URINALYSIS W/ REFLEX CULTURE    Collection Time: 02/22/18  2:57 AM   Result Value Ref Range    Color YELLOW/STRAW      Appearance CLEAR CLEAR      Specific gravity <1.005 1.003 - 1.030    pH (UA) 5.5 5.0 - 8.0      Protein NEGATIVE  NEG mg/dL    Glucose NEGATIVE  NEG mg/dL    Ketone NEGATIVE  NEG mg/dL    Bilirubin NEGATIVE  NEG      Blood NEGATIVE  NEG      Urobilinogen 0.2 0.2 - 1.0 EU/dL    Nitrites NEGATIVE  NEG      Leukocyte Esterase NEGATIVE  NEG      WBC 0-4 0 - 4 /hpf    RBC 0-5 0 - 5 /hpf    Epithelial cells FEW FEW /lpf    Bacteria NEGATIVE  NEG /hpf    UA:UC IF INDICATED CULTURE NOT INDICATED BY UA RESULT CNI     ETHYL ALCOHOL    Collection Time: 02/22/18  2:57 AM   Result Value Ref Range    ALCOHOL(ETHYL),SERUM 372 (H) <10 MG/DL     Medical Decision Making   I am the first provider for this patient. I reviewed the vital signs, available nursing notes, past medical history, past surgical history, family history and social history. Vital Signs-Reviewed the patient's vital signs. Patient Vitals for the past 12 hrs:   Temp Pulse Resp BP SpO2   02/22/18 0231 97.9 °F (36.6 °C) 93 18 (!) 149/130 100 %     Records Reviewed: Nursing Notes and Old Medical Records    Provider Notes (Medical Decision Making):     Malingering, homelessness, depression, alcohol induced mood disorder    ED Course:   Initial assessment performed. The patients presenting problems have been discussed, and they are in agreement with the care plan formulated and outlined with them. I have encouraged them to ask questions as they arise throughout their visit. Disposition:    DISCHARGE NOTE  6:00 AM  The patient has been re-evaluated and is ready for discharge. Reviewed available results with patient. Counseled patient on diagnosis and care plan. Patient has expressed understanding, and all questions have been answered. Patient agrees with plan and agrees to follow up as recommended, or return to the ED if their symptoms worsen. Discharge instructions have been provided and explained to the patient, along with reasons to return to the ED. PLAN:  1. Discharge Medication List as of 2/22/2018  6:00 AM        2. Follow-up Information     Follow up With Details Gautam Crawford MD   [id number] [id number]  [phone]          Return to ED if worse     Diagnosis     Clinical Impression:   1. Alcohol abuse    2. Alcoholic intoxication without complication ([facility] [address]        Attestations: This note is prepared by Rich Santana, acting as Scribe for April Irene MD.    April Irene MD: The scribe's documentation has been prepared under my direction and personally reviewed by me in its entirety. I confirm that the note above accurately reflects all work, treatment, procedures, and medical decision making performed by me.

## 2018-02-22 NOTE — BSMART NOTE
Axis I   Depressive disorders recurrent mild  Axis II   No axis II diagnosis   Raymond III   General medical conditions  Raymond IV  Housing problems, problems with primary support group, economic problems, problems related to the social environment and other psychosocial and environmental problems      Mild      Comprehensive Assessment Form Part 1    Section I - Disposition      The Medical Doctor to Psychiatrist conference was completed. The Medical Doctor is in agreement with Psychiatrist disposition because of (reason) Client did not meet the criteria for impatient tx. .  The plan is Client will be referred to [facility] for SA services and supports. The on-call Psychiatrist consulted was Dr. Simon Chadwick. The admitting Psychiatrist will be Dr. Marianne Mitchell. The admitting Diagnosis is Depressive Disorder and Alcohol Abuse  The Payor source is [facility] Medicaid. The name of the representative was self. Section II - Integrated Summary  Summary:  Client walked to [facility] because he was homeless , drunk and depressed. Client was very intoxicated. Client stated he need a place to sleep for the next couple of days. Client stated he was feeling very anxious. Client stated he has a lot going on in his life. Stated he was staying in hotels but he ran out of money. Stated he was living with his girlfriend and she put him out. Stated he was on disability and his check was only five hundred and sixty dollars a month. Stated he can not afford a good rooming house. Stated he was in a bad situation and he need some help. Stated he was homeless off and on for the past two years. Stated he has no where to go at this time. Stated his sister live on the [facility]. Stated he was staying at the [facility] in the past.Stated he was feeling bad and overwhelmed. Stated he need to find a cheap room. Client reported he drinked two 24 ounces of beer on today.  Client seems to be dealing with a great deal of family, relationship, housing , financial and personal stressors. Client was very anxious and depressed. Client did not want to harm himself or others at the time of this assessment. Client was appropriately dressed for the weather. Eye contact was fair. Attitude was negative but cooperative. Speech was spontaneous. Mood was depressed. Affect was flat. Thoughts were preoccupied with finding a new rooming house for next month and a place to stay for now. This [place] worker provided client with a list of rooming houses in the [facility]. Client was not a danger to himself or others. Client was able to ask and answer most questions appropriately. Referred client to [facility] for SA services and supports. The patient is deemed competent to provide informed consent. The information is given by the patient. The Chief Complaint is Client was looking for a new place to stay. .  The Precipitant Factors are Client was homeless and his check will not come to March. .  Previous Hospitalizations: yes  The patient has not previously been in restraints. Current Psychiatrist and/or  is none reported. Lethality Assessment:    The potential for suicide is noted by the following: not noted. The potential for homicide is not noted. The patient has not been a perpetrator of sexual or physical abuse. There are not pending charges. The patient is not felt to be at risk for self harm or harm to others. The attending nurse was advised to remove potentially harmful or dangerous items from the patient's room , to request a search of the patient's belongings and to remove patient clothing and place it out of immediate access to the patient. Section III - Psychosocial  The patient's overall mood and attitude is depressed. Feelings of helplessness and hopelessness are not observed. Generalized anxiety is not observed. Panic is not observed. Phobias are not observed. Obsessive compulsive tendencies are observed by ER Staff.       Section IV - Mental Status Exam  The patient's appearance is unkempt, is bizarre and is tense. The patient's behavior is agitated, displays tremors, shows poor eye contact and is restless. The patient is only aware of  time, place and person. The patient's speech is slowed. The patient's mood  is depressed, is anxious, is withdrawn, is sad and is irritable. The range of affect is flat. The patient's thought content  demonstrates obsessions . The thought process is blocking. The patient's perception demonstrated changes in the following. The patient's memory is impaired. The patient's appetite shows no evidence of impairment. The patient's sleep shows no evidence of impairment. The patient shows little insight. The patient's judgement is cognitively impaired. Section V - Substance Abuse  The patient is using substances. The patient is using alcohol for greater than 10 years with last use on today. The patient has experienced the following withdrawal symptoms,  tremors and cravings. Section VI - Living Arrangements  The patient is single. The patient lives alone. The patient has one child age adult. The patient does not plan to return home upon discharge. The patient does not have legal issues pending. The patient's source of income comes from disability. Hinduism and cultural practices have not been voiced at this time. The patient's greatest support comes from friends and this person will be involved with the treatment. The patient has been in an event described as horrible or outside the realm of ordinary life experience either currently or in the past.  The patient has not been a victim of sexual/physical abuse. Section VII - Other Areas of Clinical Concern  The highest grade achieved is 12th with the overall quality of school experience being described as fair. The patient is currently  disabled and speaks [place] as a primary language.   The patient has no communication impairments affecting communication. The patient's preference for learning can be described as: can read and write adequately. The patient's hearing is normal.  The patient's vision is impaired and  wears glasses or contacts .       Mark Sanchez
